# Patient Record
Sex: FEMALE | Race: BLACK OR AFRICAN AMERICAN | NOT HISPANIC OR LATINO | Employment: FULL TIME | URBAN - METROPOLITAN AREA
[De-identification: names, ages, dates, MRNs, and addresses within clinical notes are randomized per-mention and may not be internally consistent; named-entity substitution may affect disease eponyms.]

---

## 2017-02-13 ENCOUNTER — HOSPITAL ENCOUNTER (EMERGENCY)
Facility: HOSPITAL | Age: 52
Discharge: HOME | End: 2017-02-13
Attending: EMERGENCY MEDICINE
Payer: COMMERCIAL

## 2017-02-13 VITALS
OXYGEN SATURATION: 97 % | SYSTOLIC BLOOD PRESSURE: 130 MMHG | TEMPERATURE: 99 F | WEIGHT: 215 LBS | HEIGHT: 63 IN | BODY MASS INDEX: 38.09 KG/M2 | DIASTOLIC BLOOD PRESSURE: 74 MMHG | HEART RATE: 90 BPM | RESPIRATION RATE: 20 BRPM

## 2017-02-13 DIAGNOSIS — B34.9 ACUTE VIRAL SYNDROME: Primary | ICD-10-CM

## 2017-02-13 LAB
B-HCG UR QL: NEGATIVE
BILIRUB UR QL STRIP: NEGATIVE
CLARITY UR: CLEAR
COLOR UR: YELLOW
CTP QC/QA: YES
FLUAV AG SPEC QL IA: NEGATIVE
FLUBV AG SPEC QL IA: NEGATIVE
GLUCOSE UR QL STRIP: NEGATIVE
HGB UR QL STRIP: ABNORMAL
KETONES UR QL STRIP: NEGATIVE
LEUKOCYTE ESTERASE UR QL STRIP: NEGATIVE
MICROSCOPIC COMMENT: NORMAL
NITRITE UR QL STRIP: NEGATIVE
PH UR STRIP: 5 [PH] (ref 5–8)
POCT GLUCOSE: 94 MG/DL (ref 70–110)
PROT UR QL STRIP: NEGATIVE
RBC #/AREA URNS HPF: 4 /HPF (ref 0–4)
SP GR UR STRIP: 1.01 (ref 1–1.03)
SPECIMEN SOURCE: NORMAL
SQUAMOUS #/AREA URNS HPF: 5 /HPF
URN SPEC COLLECT METH UR: ABNORMAL
UROBILINOGEN UR STRIP-ACNC: NEGATIVE EU/DL

## 2017-02-13 PROCEDURE — 25000003 PHARM REV CODE 250: Performed by: NURSE PRACTITIONER

## 2017-02-13 PROCEDURE — 81025 URINE PREGNANCY TEST: CPT | Performed by: NURSE PRACTITIONER

## 2017-02-13 PROCEDURE — 82962 GLUCOSE BLOOD TEST: CPT

## 2017-02-13 PROCEDURE — 81000 URINALYSIS NONAUTO W/SCOPE: CPT

## 2017-02-13 PROCEDURE — 87400 INFLUENZA A/B EACH AG IA: CPT | Mod: 59

## 2017-02-13 PROCEDURE — 99283 EMERGENCY DEPT VISIT LOW MDM: CPT | Mod: 25

## 2017-02-13 RX ORDER — CETIRIZINE HYDROCHLORIDE 10 MG/1
10 TABLET ORAL DAILY PRN
Qty: 20 TABLET | Refills: 0 | Status: SHIPPED | OUTPATIENT
Start: 2017-02-13

## 2017-02-13 RX ORDER — DIPHENHYDRAMINE HCL 50 MG
50 CAPSULE ORAL NIGHTLY PRN
Qty: 20 CAPSULE | Refills: 0 | Status: SHIPPED | OUTPATIENT
Start: 2017-02-13 | End: 2022-01-10 | Stop reason: CLARIF

## 2017-02-13 RX ORDER — ACETAMINOPHEN 500 MG
1000 TABLET ORAL
Status: COMPLETED | OUTPATIENT
Start: 2017-02-13 | End: 2017-02-13

## 2017-02-13 RX ORDER — IBUPROFEN 600 MG/1
600 TABLET ORAL
Status: COMPLETED | OUTPATIENT
Start: 2017-02-13 | End: 2017-02-13

## 2017-02-13 RX ADMIN — ACETAMINOPHEN 1000 MG: 500 TABLET ORAL at 09:02

## 2017-02-13 RX ADMIN — IBUPROFEN 600 MG: 600 TABLET, FILM COATED ORAL at 10:02

## 2017-02-13 NOTE — DISCHARGE INSTRUCTIONS
Please get plenty of rest and drink lots of water to stay well-hydrated.    Take tylenol and ibuprofen for bodyaches, fever, and pain.  You can take 1,000 mg of tylenol (acetaminophen) every 4-6 hours and/or ibuprofen 600 mg every 6 hours (with food).    You can take Zyrtec and/or Benadryl for your runny nose and congestion.  You can also use the Flonase at home.    Follow up with your regular doctor in 2-3 days for further evaluation of your symptoms, if they persist.    Return to the ER for any new/worsening symptoms.

## 2017-02-13 NOTE — ED TRIAGE NOTES
C/o body aches, jaw pain, runny nose, cough,fever, HA x 1 day. Denies abd pain, N/V/D. Tisha selnathanieler plus taken last night.

## 2017-02-13 NOTE — ED PROVIDER NOTES
Encounter Date: 2/13/2017       History     Chief Complaint   Patient presents with    Generalized Body Aches     Pt states she has body aches 2 days now and her throat hurts     Review of patient's allergies indicates:  No Known Allergies  HPI Comments: This is a 51-year-old female with a history of hypertension that comes to the emergency room complaining of cold symptoms since yesterday.  Patient reports associated headache, mouth pain, body aches, runny nose, coughing, bilateral otalgia, subjective fevers and chills, and urinary frequency.  She denies any other GI or  symptoms such as nausea, vomiting, diarrhea, dysuria, or abdominal pain.  Patient reports that she attempted relief with Tisha-[place] plus without much improvement.  Patient believes that she caught the flu from her patient who was diagnosed the flu several days ago.  She reports that she did not get her flu shot this year.  No other modifying factors.    The history is provided by the patient.     Past Medical History   Diagnosis Date    Hypertension      No past medical history pertinent negatives.  History reviewed. No pertinent past surgical history.  Family History   Problem Relation Age of Onset    Hypertension Mother     Cancer Mother     HIV Mother     Hypertension Father     Heart disease Father     Hypertension Maternal Grandmother     Alzheimer's disease Maternal Grandmother      Social History   Substance Use Topics    Smoking status: Former Smoker    Smokeless tobacco: Never Used    Alcohol use No     Review of Systems   Constitutional: Positive for chills and fever (subjective).   HENT: Positive for congestion, ear pain, rhinorrhea and sneezing. Negative for ear discharge, postnasal drip and sore throat.    Respiratory: Positive for cough. Negative for shortness of breath.    Cardiovascular: Negative for chest pain.   Gastrointestinal: Negative for abdominal pain, diarrhea, nausea and vomiting.   Genitourinary: Positive  for frequency. Negative for dysuria, vaginal discharge and vaginal pain.   Musculoskeletal: Positive for myalgias.   Skin: Positive for rash.   Neurological: Positive for headaches.       Physical Exam   Initial Vitals   BP Pulse Resp Temp SpO2   02/13/17 0850 02/13/17 0850 02/13/17 0850 02/13/17 0850 02/13/17 0850   128/72 96 18 99.7 °F (37.6 °C) 96 %     Physical Exam    Nursing note and vitals reviewed.  Constitutional: Vital signs are normal. She appears well-developed and well-nourished. She is not diaphoretic. She is active and cooperative.  Non-toxic appearance. She appears ill. No distress.   Appears ill and uncomfortable   HENT:   Right Ear: Tympanic membrane and ear canal normal.   Left Ear: Tympanic membrane and ear canal normal.   Nose: Mucosal edema and rhinorrhea present. No sinus tenderness.   Mouth/Throat: Uvula is midline and mucous membranes are normal. Posterior oropharyngeal erythema (mild) present. No oropharyngeal exudate.   Mild erythema noted to the posterior oropharynx without tonsillar enlargement, exudates, or cervical lymphadenopathy.  No appreciable dental caries, cracked teeth, or evidence of odontogenic infection.   Eyes: Conjunctivae and EOM are normal. Pupils are equal, round, and reactive to light.   Neck: Normal range of motion. Neck supple.   No meningismus   Cardiovascular: Normal heart sounds.   Pulmonary/Chest: Effort normal and breath sounds normal. No accessory muscle usage. No tachypnea and no bradypnea. No respiratory distress. She has no decreased breath sounds. She has no wheezes. She has no rhonchi. She has no rales.   Lymphadenopathy:     She has no cervical adenopathy.   Neurological: She is alert and oriented to person, place, and time.         ED Course   Procedures  Labs Reviewed   URINALYSIS - Abnormal; Notable for the following:        Result Value    Occult Blood UA 2+ (*)     All other components within normal limits   INFLUENZA A AND B ANTIGEN   URINALYSIS  MICROSCOPIC   POCT URINE PREGNANCY   POCT GLUCOSE   POCT GLUCOSE MONITORING CONTINUOUS                Additional MDM:   Comments: This is an urgent evaluation of a 51-year-old female that presents to the emergency room with cold symptoms for 2 days.  She has multiple upper respiratory symptoms such as runny nose, congestion, otalgia, cough.  Her exam is benign; there is no evidence of focal bacterial infection such as meningitis, otitis media, sinusitis, strep pharyngitis, or pneumonia.  I suspect her symptoms are most likely viral in etiology.  She tested negative for influenza.  She is afebrile with unremarkable vital signs.  She does not appear toxic.  She is well-nourished and well-hydrated.  Based on today's presentation, I see no indication for further workup at this time.  I reassured patient that her symptoms are most likely self-limited and will resolve on their own without antimicrobials.  I encouraged supportive care: Rest, oral hydration, antipyretics, and decongestants.  Of note, the patient did report urinary frequency, but her urine did not appear infected and her Accu-Chek was within normal limits in the ED.  She denied abdominal pain or any other GI or  symptoms.  I am not concerned for acute abdomen.  To follow-up with PCP in 2-3 days for further evaluation of symptoms, if no significant improvement.  She was given strict return precautions for any new or worsening symptoms or concerns..                 ED Course     Clinical Impression:   The encounter diagnosis was Acute viral syndrome.    Disposition:   Disposition: Discharged  Condition: Stable       Ana Luisa Talbert NP  02/13/17 5316

## 2017-02-13 NOTE — ED AVS SNAPSHOT
[facility]  [place] [address]               Lupe Paredes   2017  8:57 AM   ED    Description:  Female : 1965   Department:  [facility]           Your Care was Coordinated By:     Provider Role From To    Patria Silva MD Attending Provider 17 --    Ana Luisa Talbert NP Nurse Practitioner 17 --      Reason for Visit     Generalized Body Aches           Diagnoses this Visit        Comments    Acute viral syndrome    -  Primary       ED Disposition     ED Disposition Condition Comment    Discharge             To Do List           Follow-up Information     Follow up with Boone Gan MD.    Specialty:  Internal Medicine    Why:  As needed    Contact information:    [address] [facility] [address] [address] [facility] [address]\[facility] [place] [facility]\[address]  [facility]  [facility] [address]  [phone]          Follow up with [facility].    Specialty:  Emergency Medicine    Why:  If symptoms worsen    Contact information:    [place] [id number] [phone]       These Medications        Disp Refills Start End    cetirizine (ZYRTEC) 10 MG tablet 20 tablet 0 2017     Take 1 tablet (10 mg total) by mouth daily as needed (runny nose/congestion). - Oral    diphenhydrAMINE (BENADRYL) 50 MG capsule 20 capsule 0 2017     Take 1 capsule (50 mg total) by mouth nightly as needed (rhinorrhea/congestion). - Oral      [facility]     [facility] -  Assistance  Registered nurses in the [facility] provide clinical advisement, health education, appointment booking, and other advisory services.  Call for this free service at [phone].             Medications           Message regarding Medications     Verify the changes and/or additions to your medication regime listed below are the same as discussed with your clinician today.  If any of these changes or  "additions are incorrect, please notify your healthcare provider.        START taking these NEW medications        Refills    cetirizine (ZYRTEC) 10 MG tablet 0    Sig: Take 1 tablet (10 mg total) by mouth daily as needed (runny nose/congestion).    Class: Print    Route: Oral    diphenhydrAMINE (BENADRYL) 50 MG capsule 0    Sig: Take 1 capsule (50 mg total) by mouth nightly as needed (rhinorrhea/congestion).    Class: Print    Route: Oral      These medications were administered today        Dose Freq    ibuprofen tablet 600 mg 600 mg ED 1 Time    Sig: Take 1 tablet (600 mg total) by mouth ED 1 Time.    Class: Normal    Route: Oral    acetaminophen tablet 1,000 mg 1,000 mg ED 1 Time    Sig: Take 2 tablets (1,000 mg total) by mouth ED 1 Time.    Class: Normal    Route: Oral           Verify that the below list of medications is an accurate representation of the medications you are currently taking.  If none reported, the list may be blank. If incorrect, please contact your healthcare provider. Carry this list with you in case of emergency.           Current Medications     lisinopril 10 MG tablet Take 10 mg by mouth once daily.    albuterol 90 mcg/actuation inhaler Inhale 1-2 puffs into the lungs every 6 (six) hours as needed for Wheezing.    cetirizine (ZYRTEC) 10 MG tablet Take 1 tablet (10 mg total) by mouth daily as needed (runny nose/congestion).    diphenhydrAMINE (BENADRYL) 50 MG capsule Take 1 capsule (50 mg total) by mouth nightly as needed (rhinorrhea/congestion).           Clinical Reference Information           Your Vitals Were     BP Pulse Temp Resp Height Weight    128/72 (BP Location: Left arm) 96 99.7 °F (37.6 °C) 18 5' 3" (1.6 m) 97.5 kg (215 lb)    SpO2 BMI             96% 38.09 kg/m2         Allergies as of 2/13/2017     No Known Allergies      Immunizations Administered on Date of Encounter - 2/13/2017     None      ED Micro, Lab, POCT     Start Ordered       Status Ordering Provider    02/13/17 " 1035 02/13/17 1035  POCT glucose  Once      Final result     02/13/17 1008 02/13/17 1007  POCT glucose  Once      Acknowledged     02/13/17 0919 02/13/17 0918  POCT urine pregnancy  Once      Final result     02/13/17 0917 02/13/17 0917  Urinalysis  STAT      Final result     02/13/17 0917 02/13/17 0917  Influenza antigen Nasopharyngeal Swab  STAT      Final result     02/13/17 0917 02/13/17 0917  Urinalysis Microscopic  Once      Final result       ED Imaging Orders     None        Discharge Instructions       Please get plenty of rest and drink lots of water to stay well-hydrated.    Take tylenol and ibuprofen for bodyaches, fever, and pain.  You can take 1,000 mg of tylenol (acetaminophen) every 4-6 hours and/or ibuprofen 600 mg every 6 hours (with food).    You can take Zyrtec and/or Benadryl for your runny nose and congestion.  You can also use the Flonase at home.    Follow up with your regular doctor in 2-3 days for further evaluation of your symptoms, if they persist.    Return to the ER for any new/worsening symptoms.        Discharge References/Attachments     VIRAL SYNDROME (ADULT) (ENGLISH)      [facility] Sign-Up     Activating your [facility] account is as easy as 1-2-3!     1) Visit [email or web address], select Sign Up Now, enter this activation code and your date of birth, then select Next.  [email or web address]  Expires: 3/30/2017 11:32 AM      2) Create a username and password to use when you visit [facility] in the future and select a security question in case you lose your password and select Next.    3) Enter your e-mail address and click Sign Up!    Additional Information  If you have questions, please e-mail [email or web address] or call [phone] to talk to our [facility] staff. Remember, [facility] is NOT to be used for urgent needs. For medical emergencies, dial 911.         Smoking Cessation     If you would like to quit smoking:   You may be eligible for free services if you are a [place]  resident and started smoking cigarettes before September 1, 1988.  Call the [employer]oking [employer]ess[employer] ([employer]) toll free at (542) 256-7541 or (943) 788-6974.   Call 1-800-QUIT-NOW if you do not meet the above criteria.             [facility] complies with applicable Federal civil rights laws and does not discriminate on the basis of race, color, national origin, age, disability, or sex.        Language Assistance Services     ATTENTION: Language assistance services are available, free of charge. Please call [phone].      ATENCIÓN: Si habla español, tiene a ruiz disposición servicios gratuitos de asistencia lingüística. Llame al [phone].     CHÚ Ý: N?u b?n nói Ti?ng Vi?t, có các d?ch v? h? tr? ngôn ng? mi?n phí dành cho b?n. G?i s? [phone].

## 2017-11-13 ENCOUNTER — HOSPITAL ENCOUNTER (EMERGENCY)
Facility: HOSPITAL | Age: 52
Discharge: HOME | End: 2017-11-13
Attending: EMERGENCY MEDICINE
Payer: MEDICAID

## 2017-11-13 VITALS
DIASTOLIC BLOOD PRESSURE: 73 MMHG | HEIGHT: 62 IN | BODY MASS INDEX: 39.01 KG/M2 | SYSTOLIC BLOOD PRESSURE: 128 MMHG | RESPIRATION RATE: 16 BRPM | HEART RATE: 76 BPM | TEMPERATURE: 98 F | OXYGEN SATURATION: 98 % | WEIGHT: 212 LBS

## 2017-11-13 DIAGNOSIS — N10 PYELONEPHRITIS, ACUTE: Primary | ICD-10-CM

## 2017-11-13 LAB
ALBUMIN SERPL BCP-MCNC: 3.4 G/DL
ALP SERPL-CCNC: 77 U/L
ALT SERPL W/O P-5'-P-CCNC: 12 U/L
AMORPH CRY URNS QL MICRO: ABNORMAL
ANION GAP SERPL CALC-SCNC: 6 MMOL/L
AST SERPL-CCNC: 12 U/L
B-HCG UR QL: NEGATIVE
BACTERIA #/AREA URNS HPF: ABNORMAL /HPF
BASOPHILS # BLD AUTO: 0.01 K/UL
BASOPHILS NFR BLD: 0.1 %
BILIRUB SERPL-MCNC: 0.2 MG/DL
BILIRUB UR QL STRIP: NEGATIVE
BUN SERPL-MCNC: 11 MG/DL
CALCIUM SERPL-MCNC: 9.3 MG/DL
CHLORIDE SERPL-SCNC: 107 MMOL/L
CLARITY UR: ABNORMAL
CO2 SERPL-SCNC: 27 MMOL/L
COLOR UR: YELLOW
CREAT SERPL-MCNC: 0.7 MG/DL
CTP QC/QA: YES
DIFFERENTIAL METHOD: ABNORMAL
EOSINOPHIL # BLD AUTO: 0.1 K/UL
EOSINOPHIL NFR BLD: 1.2 %
ERYTHROCYTE [DISTWIDTH] IN BLOOD BY AUTOMATED COUNT: 14.2 %
EST. GFR  (AFRICAN AMERICAN): >60 ML/MIN/1.73 M^2
EST. GFR  (NON AFRICAN AMERICAN): >60 ML/MIN/1.73 M^2
GLUCOSE SERPL-MCNC: 90 MG/DL
GLUCOSE UR QL STRIP: NEGATIVE
HCT VFR BLD AUTO: 36.5 %
HGB BLD-MCNC: 12.4 G/DL
HGB UR QL STRIP: ABNORMAL
HYALINE CASTS #/AREA URNS LPF: 0 /LPF
KETONES UR QL STRIP: NEGATIVE
LEUKOCYTE ESTERASE UR QL STRIP: ABNORMAL
LIPASE SERPL-CCNC: 34 U/L
LYMPHOCYTES # BLD AUTO: 2.8 K/UL
LYMPHOCYTES NFR BLD: 33.1 %
MCH RBC QN AUTO: 27.8 PG
MCHC RBC AUTO-ENTMCNC: 34 G/DL
MCV RBC AUTO: 82 FL
MICROSCOPIC COMMENT: ABNORMAL
MONOCYTES # BLD AUTO: 0.5 K/UL
MONOCYTES NFR BLD: 5.5 %
NEUTROPHILS # BLD AUTO: 5.2 K/UL
NEUTROPHILS NFR BLD: 60.1 %
NITRITE UR QL STRIP: NEGATIVE
PH UR STRIP: 7 [PH] (ref 5–8)
PLATELET # BLD AUTO: 229 K/UL
PMV BLD AUTO: 9.5 FL
POTASSIUM SERPL-SCNC: 3.8 MMOL/L
PROT SERPL-MCNC: 7.8 G/DL
PROT UR QL STRIP: ABNORMAL
RBC # BLD AUTO: 4.46 M/UL
RBC #/AREA URNS HPF: >100 /HPF (ref 0–4)
SODIUM SERPL-SCNC: 140 MMOL/L
SP GR UR STRIP: 1.01 (ref 1–1.03)
SQUAMOUS #/AREA URNS HPF: 6 /HPF
URN SPEC COLLECT METH UR: ABNORMAL
UROBILINOGEN UR STRIP-ACNC: NEGATIVE EU/DL
WBC # BLD AUTO: 8.57 K/UL
WBC #/AREA URNS HPF: 5 /HPF (ref 0–5)

## 2017-11-13 PROCEDURE — 63600175 PHARM REV CODE 636 W HCPCS: Performed by: NURSE PRACTITIONER

## 2017-11-13 PROCEDURE — 96365 THER/PROPH/DIAG IV INF INIT: CPT | Mod: 59

## 2017-11-13 PROCEDURE — 80053 COMPREHEN METABOLIC PANEL: CPT

## 2017-11-13 PROCEDURE — 87086 URINE CULTURE/COLONY COUNT: CPT

## 2017-11-13 PROCEDURE — 87077 CULTURE AEROBIC IDENTIFY: CPT

## 2017-11-13 PROCEDURE — 87186 SC STD MICRODIL/AGAR DIL: CPT

## 2017-11-13 PROCEDURE — 25500020 PHARM REV CODE 255: Performed by: EMERGENCY MEDICINE

## 2017-11-13 PROCEDURE — 99284 EMERGENCY DEPT VISIT MOD MDM: CPT | Mod: 25

## 2017-11-13 PROCEDURE — 96361 HYDRATE IV INFUSION ADD-ON: CPT

## 2017-11-13 PROCEDURE — 83690 ASSAY OF LIPASE: CPT

## 2017-11-13 PROCEDURE — 87088 URINE BACTERIA CULTURE: CPT

## 2017-11-13 PROCEDURE — 25000003 PHARM REV CODE 250: Performed by: NURSE PRACTITIONER

## 2017-11-13 PROCEDURE — 81000 URINALYSIS NONAUTO W/SCOPE: CPT

## 2017-11-13 PROCEDURE — 81025 URINE PREGNANCY TEST: CPT | Performed by: NURSE PRACTITIONER

## 2017-11-13 PROCEDURE — 96375 TX/PRO/DX INJ NEW DRUG ADDON: CPT

## 2017-11-13 PROCEDURE — 85025 COMPLETE CBC W/AUTO DIFF WBC: CPT

## 2017-11-13 RX ORDER — ONDANSETRON 2 MG/ML
8 INJECTION INTRAMUSCULAR; INTRAVENOUS
Status: COMPLETED | OUTPATIENT
Start: 2017-11-13 | End: 2017-11-13

## 2017-11-13 RX ORDER — ASPIRIN 81 MG/1
81 TABLET ORAL DAILY
COMMUNITY

## 2017-11-13 RX ORDER — AMOXICILLIN 500 MG
2 CAPSULE ORAL DAILY
COMMUNITY

## 2017-11-13 RX ORDER — ERGOCALCIFEROL 1.25 MG/1
50000 CAPSULE ORAL
COMMUNITY
End: 2022-01-10 | Stop reason: CLARIF

## 2017-11-13 RX ORDER — HYDROMORPHONE HYDROCHLORIDE 2 MG/ML
1 INJECTION, SOLUTION INTRAMUSCULAR; INTRAVENOUS; SUBCUTANEOUS
Status: COMPLETED | OUTPATIENT
Start: 2017-11-13 | End: 2017-11-13

## 2017-11-13 RX ORDER — CEPHALEXIN 500 MG/1
500 CAPSULE ORAL EVERY 12 HOURS
Qty: 20 CAPSULE | Refills: 0 | Status: SHIPPED | OUTPATIENT
Start: 2017-11-13 | End: 2017-11-23

## 2017-11-13 RX ADMIN — ONDANSETRON 8 MG: 2 INJECTION, SOLUTION INTRAMUSCULAR; INTRAVENOUS at 06:11

## 2017-11-13 RX ADMIN — CEFTRIAXONE 1 G: 1 INJECTION, SOLUTION INTRAVENOUS at 08:11

## 2017-11-13 RX ADMIN — IOHEXOL 75 ML: 350 INJECTION, SOLUTION INTRAVENOUS at 07:11

## 2017-11-13 RX ADMIN — HYDROMORPHONE HYDROCHLORIDE 1 MG: 2 INJECTION, SOLUTION INTRAMUSCULAR; INTRAVENOUS; SUBCUTANEOUS at 06:11

## 2017-11-13 RX ADMIN — SODIUM CHLORIDE 1000 ML: 0.9 INJECTION, SOLUTION INTRAVENOUS at 06:11

## 2017-11-13 NOTE — ED TRIAGE NOTES
Pt reports RLQ abd pain starting this am. Denies chest pain, nv, constipation. Also urinary frequency

## 2017-11-13 NOTE — ED PROVIDER NOTES
"Encounter Date: 11/13/2017    SCRIBE #1 NOTE: I, GomezMacy Apollo, am scribing for, and in the presence of,  Stephon Espinal - NP. I have scribed the following portions of the note - Other sections scribed: HPI, ROS.       History     Chief Complaint   Patient presents with    Abdominal Pain     Right lower abd pain that is intermittent since this am. +Urinary frequency, denies dysuria      CC: Abdominal Pain     51 y/o female with HTN presents to the ED c/o acute onset RLQ abdominal pain that started this morning. The patient reports a "sharp" pain that is severe (7/10); palpation exacerbates the pain. The patient presented to her PCP last wk where she was told that she had "crystals" in her urine. The patient states that the nurse at her PCP's office was supposed to send "something" to this facility; however, she isn't sure what it is they're sending. The patient is also c/o lumbar back pain and urinary frequency. The patient denies hx of abdominal surgeries. The patient denies vaginal bleeding, vaginal d/c, N/V/D, fever, or dysuria. No attempted treatment reported. No other symptoms reported.      The history is provided by the patient. No  was used.     Review of patient's allergies indicates:  No Known Allergies  Past Medical History:   Diagnosis Date    Hypertension      No past surgical history on file.  Family History   Problem Relation Age of Onset    Hypertension Mother     Cancer Mother     HIV Mother     Hypertension Father     Heart disease Father     Hypertension Maternal Grandmother     Alzheimer's disease Maternal Grandmother      Social History   Substance Use Topics    Smoking status: Former Smoker    Smokeless tobacco: Never Used    Alcohol use No     Review of Systems   Constitutional: Negative for chills and fever.   HENT: Negative for rhinorrhea.    Eyes: Negative for redness.   Respiratory: Negative for cough and shortness of breath.    Cardiovascular: Negative for " chest pain.   Gastrointestinal: Positive for abdominal pain (RLQ). Negative for diarrhea, nausea and vomiting.   Genitourinary: Positive for frequency. Negative for difficulty urinating, dysuria, vaginal bleeding and vaginal discharge.   Musculoskeletal: Positive for back pain (lumbar).        (-) arm or leg trouble   Skin: Negative for rash.   Neurological: Negative for headaches.       Physical Exam     Initial Vitals [11/13/17 1603]   BP Pulse Resp Temp SpO2   (!) 160/76 82 16 99.1 °F (37.3 °C) 100 %      MAP       104         Physical Exam    Nursing note and vitals reviewed.  Constitutional: She appears well-developed and well-nourished. She is not diaphoretic. No distress.   HENT:   Head: Normocephalic and atraumatic.   Right Ear: External ear normal.   Left Ear: External ear normal.   Nose: Nose normal.   Eyes: Conjunctivae and EOM are normal. Pupils are equal, round, and reactive to light. Right eye exhibits no discharge. Left eye exhibits no discharge. No scleral icterus.   Neck: Normal range of motion. Neck supple. No thyromegaly present. No tracheal deviation present. No JVD present.   Cardiovascular: Normal rate, regular rhythm, normal heart sounds and intact distal pulses. Exam reveals no gallop and no friction rub.    No murmur heard.  Pulmonary/Chest: Breath sounds normal. No stridor. No respiratory distress. She has no wheezes. She has no rhonchi. She has no rales.   Abdominal: Soft. Bowel sounds are normal. She exhibits no distension and no mass. There is tenderness in the right lower quadrant. There is tenderness at McBurney's point. There is no rigidity, no rebound, no guarding and negative Bryan's sign.   Musculoskeletal: Normal range of motion. She exhibits no edema or tenderness.   Lymphadenopathy:     She has no cervical adenopathy.   Neurological: She is alert and oriented to person, place, and time. She has normal strength and normal reflexes. She displays normal reflexes. No cranial nerve  deficit or sensory deficit.   Skin: Skin is warm and dry. No rash and no abscess noted. No erythema. No pallor.   Psychiatric: She has a normal mood and affect. Her behavior is normal. Judgment and thought content normal.         ED Course   Procedures  Labs Reviewed   CBC W/ AUTO DIFFERENTIAL - Abnormal; Notable for the following:        Result Value    Hematocrit 36.5 (*)     All other components within normal limits   COMPREHENSIVE METABOLIC PANEL - Abnormal; Notable for the following:     Albumin 3.4 (*)     Anion Gap 6 (*)     All other components within normal limits   URINALYSIS - Abnormal; Notable for the following:     Appearance, UA Hazy (*)     Protein, UA 2+ (*)     Occult Blood UA 3+ (*)     Leukocytes, UA 2+ (*)     All other components within normal limits   URINALYSIS MICROSCOPIC - Abnormal; Notable for the following:     RBC, UA >100 (*)     Bacteria, UA Many (*)     All other components within normal limits   CULTURE, URINE   LIPASE   POCT URINE PREGNANCY             Medical Decision Making:   Differential Diagnosis:   Suspect pyelonephritis. Less likely nephrolithiasis, appendicitis, cholecystitis, diverticulitis, colitis, others  Clinical Tests:   Lab Tests: Ordered and Reviewed  Radiological Study: Ordered and Reviewed  ED Management:  52-year-old female presenting for evaluation of abdominal pain that began this morning. She denies any associated symptoms. Pain is in the right lower quadrant. She is afebrile, well-appearing, in no apparent distress. Abdomen is soft without rigidity or guarding. There is RLQ abdominal TTP. Tenderness at McBurney's point. No peritoneal signs. Urinalysis remarkable for evidence of infection. Other labs are unremarkable. CT shows evidence of possible recently passed renal stone and pyelonephritis. I suspect pyelonephritis given urinalysis results. No evidence of appendicitis or additional intra-abdominal pathology. Urine culture is in process. Treated with Rocephin  and Keflex. Instructed patient to follow-up with urology. ED return precautions given. Patient expressed understanding of diagnosis and discharge instructions.  Other:   I have discussed this case with another health care provider.       <> Summary of the Discussion: Case discussed with my attending physician Kris Torres M.D. who agreed with the assessment and plan.            Scribe Attestation:   Scribe #1: I performed the above scribed service and the documentation accurately describes the services I performed. I attest to the accuracy of the note.    Attending Attestation:           Physician Attestation for Scribe:  Physician Attestation Statement for Scribe #1: I, Stephon Espinal- BARNEY, reviewed documentation, as scribed by Jakob Bustillos in my presence, and it is both accurate and complete.                 ED Course      Clinical Impression:   The encounter diagnosis was Pyelonephritis, acute.    Disposition:   Disposition: Discharged  Condition: Stable                        Stephon Espinal NP  11/14/17 0137

## 2017-11-14 NOTE — DISCHARGE INSTRUCTIONS
Follow-up with urology as discussed.    Take antibiotics as prescribed until they are gone even if your symptoms improve.    Return to the emergency department for any new or worsening symptoms.

## 2017-11-15 LAB — BACTERIA UR CULT: NORMAL

## 2017-11-20 ENCOUNTER — HOSPITAL ENCOUNTER (EMERGENCY)
Facility: HOSPITAL | Age: 52
Discharge: HOME | End: 2017-11-20
Attending: EMERGENCY MEDICINE
Payer: MEDICAID

## 2017-11-20 VITALS
SYSTOLIC BLOOD PRESSURE: 139 MMHG | DIASTOLIC BLOOD PRESSURE: 74 MMHG | HEIGHT: 62 IN | TEMPERATURE: 99 F | OXYGEN SATURATION: 98 % | WEIGHT: 215 LBS | RESPIRATION RATE: 18 BRPM | BODY MASS INDEX: 39.56 KG/M2 | HEART RATE: 74 BPM

## 2017-11-20 DIAGNOSIS — N23 RENAL COLIC ON RIGHT SIDE: Primary | ICD-10-CM

## 2017-11-20 LAB
B-HCG UR QL: NEGATIVE
BACTERIA #/AREA URNS HPF: NORMAL /HPF
BILIRUB UR QL STRIP: NEGATIVE
CLARITY UR: CLEAR
COLOR UR: COLORLESS
CTP QC/QA: YES
GLUCOSE UR QL STRIP: NEGATIVE
HGB UR QL STRIP: ABNORMAL
KETONES UR QL STRIP: NEGATIVE
LEUKOCYTE ESTERASE UR QL STRIP: NEGATIVE
MICROSCOPIC COMMENT: NORMAL
NITRITE UR QL STRIP: NEGATIVE
PH UR STRIP: 6 [PH] (ref 5–8)
PROT UR QL STRIP: NEGATIVE
RBC #/AREA URNS HPF: 2 /HPF (ref 0–4)
SP GR UR STRIP: 1 (ref 1–1.03)
SQUAMOUS #/AREA URNS HPF: 2 /HPF
URN SPEC COLLECT METH UR: ABNORMAL
UROBILINOGEN UR STRIP-ACNC: NEGATIVE EU/DL

## 2017-11-20 PROCEDURE — 81000 URINALYSIS NONAUTO W/SCOPE: CPT

## 2017-11-20 PROCEDURE — 25000003 PHARM REV CODE 250: Performed by: NURSE PRACTITIONER

## 2017-11-20 PROCEDURE — 99284 EMERGENCY DEPT VISIT MOD MDM: CPT

## 2017-11-20 PROCEDURE — 81025 URINE PREGNANCY TEST: CPT | Performed by: PHYSICIAN ASSISTANT

## 2017-11-20 RX ORDER — TAMSULOSIN HYDROCHLORIDE 0.4 MG/1
0.4 CAPSULE ORAL DAILY PRN
Qty: 10 CAPSULE | Refills: 0 | Status: SHIPPED | OUTPATIENT
Start: 2017-11-20 | End: 2022-01-10 | Stop reason: CLARIF

## 2017-11-20 RX ORDER — NAPROXEN 500 MG/1
500 TABLET ORAL ONCE
Status: COMPLETED | OUTPATIENT
Start: 2017-11-20 | End: 2017-11-20

## 2017-11-20 RX ORDER — ONDANSETRON 4 MG/1
4 TABLET, ORALLY DISINTEGRATING ORAL EVERY 8 HOURS PRN
Qty: 15 TABLET | Refills: 0 | Status: SHIPPED | OUTPATIENT
Start: 2017-11-20 | End: 2022-01-10 | Stop reason: CLARIF

## 2017-11-20 RX ORDER — TRAMADOL HYDROCHLORIDE 50 MG/1
50 TABLET ORAL EVERY 6 HOURS PRN
Qty: 15 TABLET | Refills: 0 | Status: SHIPPED | OUTPATIENT
Start: 2017-11-20 | End: 2017-11-30

## 2017-11-20 RX ORDER — TRAMADOL HYDROCHLORIDE 50 MG/1
50 TABLET ORAL
Status: COMPLETED | OUTPATIENT
Start: 2017-11-20 | End: 2017-11-20

## 2017-11-20 RX ORDER — TAMSULOSIN HYDROCHLORIDE 0.4 MG/1
0.4 CAPSULE ORAL
Status: COMPLETED | OUTPATIENT
Start: 2017-11-20 | End: 2017-11-20

## 2017-11-20 RX ADMIN — NAPROXEN 500 MG: 500 TABLET ORAL at 09:11

## 2017-11-20 RX ADMIN — TAMSULOSIN HYDROCHLORIDE 0.4 MG: 0.4 CAPSULE ORAL at 09:11

## 2017-11-20 RX ADMIN — TRAMADOL HYDROCHLORIDE 50 MG: 50 TABLET, FILM COATED ORAL at 09:11

## 2017-11-21 NOTE — DISCHARGE INSTRUCTIONS
Please stay well-hydrated and drink plenty of fluids.    Take Flomax daily when you're having urinary symptoms.    Take naproxen for pain and inflammation, as needed.  Take approximately 500 mg every 12 hours.  You can take tramadol for breakthrough pain, as needed.  Tramadol may cause drowsiness, so please use with caution.    Take zofran if you develop nausea or vomiting, as needed.    Follow-up with urology for further evaluation and management of your kidney stones.    Use your urine strainer to try to collect stones from your urine.  On your previous exam 11 years ago, you had calcium oxalate stones present in your urine.  This is most likely what is causing your kidney stones today.  See the handout for ways to prevent kidney stones.    Return to the emergency room for any new or worsening symptoms or concerns.

## 2017-11-21 NOTE — ED PROVIDER NOTES
"Encounter Date: 11/20/2017    SCRIBE #1 NOTE: I, Manuela Barragan , am scribing for, and in the presence of,  Ana Luisa Talbert NP . I have scribed the following portions of the note - Other sections scribed: HPI and ROS .       History     Chief Complaint   Patient presents with    Urinary Tract Infection     states seen 11/14 for UTI given keflex states initially better but got worse since yesterday. C/o pain suprapubic and urinary urgency.     CC:Suprapubic abdominal pain and flank pain.   History provided by the patient.   Pt arrived via personal transportation.     HPI: This 52 y.o. Female, who has HTN, presents to the ED for evaluation of waxing and waning (7/10) suprapubic abdominal pain and Right lateral flank pain that began greater than 1 week ago. She was evaluated in this ED on 11/13 (7 days ago) and was prescribed Cephalexin 500 Mg to take for 7 days. She completed medication yesterday. She notes that symptoms improved some, but began to worsen last night. She also reports experiencing vaginal "throbbing" when urinating. Further associated is reduced appetite and urinary frequency. Today, she reports having blood on tissue paper after urinating. She took Tylenol last night and today (2 hours ago) with no relief of pain. She denies fever, chills, nausea, vomiting, vaginal discharge, or vaginal bleeding. No further reported symptoms.       The history is provided by the patient. No  was used.     Review of patient's allergies indicates:  No Known Allergies  Past Medical History:   Diagnosis Date    Hypertension      History reviewed. No pertinent surgical history.  Family History   Problem Relation Age of Onset    Hypertension Mother     Cancer Mother     HIV Mother     Hypertension Father     Heart disease Father     Hypertension Maternal Grandmother     Alzheimer's disease Maternal Grandmother      Social History   Substance Use Topics    Smoking status: Former Smoker    Smokeless " tobacco: Never Used    Alcohol use No     Review of Systems   Constitutional: Negative for chills and fever.   HENT: Negative for congestion.    Eyes: Negative for redness.   Respiratory: Negative for cough.    Cardiovascular: Negative for chest pain.   Gastrointestinal: Positive for abdominal pain. Negative for diarrhea, nausea and vomiting.   Genitourinary: Positive for flank pain and frequency. Negative for vaginal bleeding and vaginal discharge.   Musculoskeletal: Negative for myalgias.   Skin: Negative for rash.   Neurological: Negative for headaches.       Physical Exam     Initial Vitals [11/20/17 1817]   BP Pulse Resp Temp SpO2   (!) 146/87 84 18 98.6 °F (37 °C) 98 %      MAP       106.67         Physical Exam    Nursing note and vitals reviewed.  Constitutional: Vital signs are normal. She appears well-developed and well-nourished. She is not diaphoretic. She is active and cooperative. She does not appear ill. No distress.   Pulmonary/Chest: No respiratory distress.   Abdominal: Soft. She exhibits no mass. There is no hepatosplenomegaly. There is tenderness (right flank extending to RLQ and suprapubic region). There is no rigidity, no rebound, no guarding, no CVA tenderness, no tenderness at McBurney's point and negative Bryan's sign. No hernia.   Neurological: She is alert and oriented to person, place, and time.   Skin: Skin is warm and dry. Capillary refill takes less than 2 seconds. No pallor.   Psychiatric: She has a normal mood and affect.         ED Course   Procedures  Labs Reviewed   URINALYSIS - Abnormal; Notable for the following:        Result Value    Color, UA Colorless (*)     Occult Blood UA 2+ (*)     All other components within normal limits   URINALYSIS MICROSCOPIC   POCT URINE PREGNANCY                Additional MDM:   Comments: This is an urgent evaluation of a 51 y/o female that presents to the ER c/o R-sided abdominal pain. Pt was seen for this complaint 1 week ago at this facility  and found to have a UTI; she has been compliant with her keflex and reports symptoms were resolving until last night.  Medical records were reviewed, and the e.coli found in her urine culture is sensitive to keflex prescribed.  UA today does not appear infected.  CT abdomen and pelvis performed on last visit was notable for 3 nonobstructing nephrolithiasis, which I suspect is the cause of her returning pain today. Clinically, her symptoms correlate with renal colic. Her appendix was normal in appearance on CT. Her renal function was also normal last week.  I see no indication for repeat workup today. Will start on flomax and Rx antiemetics and pain control.  She already has f/u appointment for Urology at [facility].  UA from 2006 notable for calcium oxalate stones - given handout for diet/lifestyle changes to prevent future stones.  Return precautions.  Case discussed with attending, , and she is in agreement with plan. Stable for discharge and outpatient follow.  .          Scribe Attestation:   Scribe #1: I performed the above scribed service and the documentation accurately describes the services I performed. I attest to the accuracy of the note.    Attending Attestation:           Physician Attestation for Scribe:  Physician Attestation Statement for Scribe #1: I, Manuela Barragan , reviewed documentation, as scribed by Ana Luisa Talbert NP  in my presence, and it is both accurate and complete.                 ED Course      Clinical Impression:   The encounter diagnosis was Renal colic on right side.    Disposition:   Disposition: Discharged  Condition: Stable                        Ana Luisa Talbert NP  11/21/17 8807

## 2017-11-21 NOTE — ED TRIAGE NOTES
Pt states was diagnosed with UTI last week and been on abx but now states that the pain has returned.  Pt states having pain every time voids.  Pt reports small amount of blood in urine.  Denies fever or chills.  Pt rates pain as 7.

## 2018-11-01 ENCOUNTER — HOSPITAL ENCOUNTER (EMERGENCY)
Facility: HOSPITAL | Age: 53
Discharge: HOME | End: 2018-11-01
Attending: EMERGENCY MEDICINE
Payer: MEDICAID

## 2018-11-01 VITALS
SYSTOLIC BLOOD PRESSURE: 134 MMHG | WEIGHT: 213 LBS | RESPIRATION RATE: 18 BRPM | HEIGHT: 62 IN | BODY MASS INDEX: 39.2 KG/M2 | DIASTOLIC BLOOD PRESSURE: 88 MMHG | OXYGEN SATURATION: 100 % | TEMPERATURE: 98 F | HEART RATE: 75 BPM

## 2018-11-01 DIAGNOSIS — K08.89 PAIN, DENTAL: Primary | ICD-10-CM

## 2018-11-01 PROCEDURE — 99284 EMERGENCY DEPT VISIT MOD MDM: CPT

## 2018-11-01 PROCEDURE — 63600175 PHARM REV CODE 636 W HCPCS: Performed by: PHYSICIAN ASSISTANT

## 2018-11-01 RX ORDER — PENICILLIN V POTASSIUM 500 MG/1
500 TABLET, FILM COATED ORAL 4 TIMES DAILY
Qty: 20 TABLET | Refills: 0 | Status: SHIPPED | OUTPATIENT
Start: 2018-11-01 | End: 2018-11-06

## 2018-11-01 RX ORDER — KETOROLAC TROMETHAMINE 30 MG/ML
15 INJECTION, SOLUTION INTRAMUSCULAR; INTRAVENOUS
Status: COMPLETED | OUTPATIENT
Start: 2018-11-01 | End: 2018-11-01

## 2018-11-01 RX ORDER — ETODOLAC 300 MG/1
300 CAPSULE ORAL 2 TIMES DAILY
Qty: 10 CAPSULE | Refills: 0 | OUTPATIENT
Start: 2018-11-01 | End: 2019-05-22

## 2018-11-01 RX ADMIN — KETOROLAC TROMETHAMINE 15 MG: 30 INJECTION, SOLUTION INTRAMUSCULAR at 09:11

## 2018-11-02 NOTE — DISCHARGE INSTRUCTIONS
Walk-in dental clinic:  [facility], [address] [place]. [address].  [phone].  Arrive at 8:00 a.m..  Monday through Thursday. $75 - $195 per extraction cash or credit card

## 2018-11-02 NOTE — ED PROVIDER NOTES
Encounter Date: 11/1/2018    SCRIBE #1 NOTE: I, Elsa Mosley, am scribing for, and in the presence of,  Rene Corey PA-C. I have scribed the following portions of the note - Other sections scribed: HPI and ROS.       History     Chief Complaint   Patient presents with    Otalgia     Pt reports right ear or dental pain that started about 2 days ago (not sure what is the cause, they both hurt).  Reports taking Tylenol at 1800.    Dental Pain     CC: Facial Pain    HPI: This 53 y.o. Female, with a medical history of hypertension, presents to the ED c/o atraumatic right sided lower facial pain that began 2x days ago. Pt reports that the symptoms worsened this morning, noting that the pain is presently radiating towards the right ear. Symptoms are acute, constant and severe (9/10). Pt reports use of Extra Strength Tylenol, Aleve, Goody Powder and Ibuprofen for treatment. Pt denies previous dental extractions, but notes that a posterior right tooth fell out on its own 2x years ago. She also denies trouble swallowing, neck pain, difficulty hearing and fever. No other associated symptoms.         The history is provided by the patient. No  was used.     Review of patient's allergies indicates:  No Known Allergies  Past Medical History:   Diagnosis Date    Hypertension      History reviewed. No pertinent surgical history.  Family History   Problem Relation Age of Onset    Hypertension Mother     Cancer Mother     HIV Mother     Hypertension Father     Heart disease Father     Hypertension Maternal Grandmother     Alzheimer's disease Maternal Grandmother      Social History     Tobacco Use    Smoking status: Former Smoker    Smokeless tobacco: Never Used   Substance Use Topics    Alcohol use: No    Drug use: No     Review of Systems   Constitutional: Negative for fever.   HENT: Negative for sore throat and trouble swallowing.         (+) right sided facial pain radiating towards the right  ear   Respiratory: Negative for shortness of breath.    Cardiovascular: Negative for chest pain.   Gastrointestinal: Negative for nausea.   Genitourinary: Negative for dysuria.   Musculoskeletal: Negative for back pain and neck pain.   Skin: Negative for rash.   Neurological: Negative for weakness.       Physical Exam     Initial Vitals [11/01/18 2110]   BP Pulse Resp Temp SpO2   (!) 149/85 75 14 98.5 °F (36.9 °C) 100 %      MAP       --         Physical Exam    Vitals reviewed.  Constitutional: She appears well-developed and well-nourished. She is not diaphoretic. No distress.   HENT:   Head: Normocephalic and atraumatic.   Right Ear: Tympanic membrane, external ear and ear canal normal.   Left Ear: Tympanic membrane, external ear and ear canal normal.   Nose: Nose normal.   Mouth/Throat: Oropharynx is clear and moist. No oropharyngeal exudate, posterior oropharyngeal edema, posterior oropharyngeal erythema or tonsillar abscesses.   Tenderness to the gum line along the right lower molars with minimal edema. No fluctuance.  No sublingual tenderness or rigidity.   Eyes: Conjunctivae are normal. No scleral icterus.   Neck: Normal range of motion. Neck supple.   No submandibular tenderness, induration, edema, or erythema.   Cardiovascular: Normal rate, regular rhythm and intact distal pulses.   Pulmonary/Chest: No respiratory distress.   Musculoskeletal: Normal range of motion.   Lymphadenopathy:     She has no cervical adenopathy.   Neurological: She is alert and oriented to person, place, and time.   Skin: Skin is warm and dry. No rash noted. No erythema.         ED Course   Procedures  Labs Reviewed - No data to display       Imaging Results    None          Medical Decision Making:   ED Management:  This patient presents to the Emergency Department for dental pain x3 days. The patient reports no fever, chills, difficulty breathing, or inability to open mouth. Exam shows a non-toxic, afebrile, and well appearing  female with VS within normal limits. There is no identified oral drainable abscess at this time.  No facial cellulitis, airway compromise, sublingual swelling/elevation, or trismus. Neck spaces are soft. I considered, but at this time, do not suspect Paulino's angina, deep space infection, peritonsillar infection, pharyngitis, mastoiditis, otitis externa, or acute vascular pathology. There is tenderness and gingival swelling surrounding the right lower molars.  Patient treated with antibiotics and analgesics, and instructed to follow-up ASAP with dentist for further evaluation.  Resources for dentist were provided.  Patient is safe for discharge. Return precautions given.  All questions answered.  Patient verbalized understanding and agreed with plan.              Scribe Attestation:   Scribe #1: I performed the above scribed service and the documentation accurately describes the services I performed. I attest to the accuracy of the note.    Attending Attestation:           Physician Attestation for Scribe:  Physician Attestation Statement for Scribe #1: I, Rene Corey PA-C, reviewed documentation, as scribed by Elsa Mosley in my presence, and it is both accurate and complete.                    Clinical Impression:   The encounter diagnosis was Pain, dental.                             Rene Corey PA-C  11/01/18 9983

## 2019-01-28 ENCOUNTER — HOSPITAL ENCOUNTER (EMERGENCY)
Facility: HOSPITAL | Age: 54
Discharge: HOME | End: 2019-01-29
Attending: EMERGENCY MEDICINE
Payer: MEDICAID

## 2019-01-28 DIAGNOSIS — R07.9 CHEST PAIN: Primary | ICD-10-CM

## 2019-01-28 DIAGNOSIS — R06.02 SOB (SHORTNESS OF BREATH): ICD-10-CM

## 2019-01-28 PROCEDURE — 84484 ASSAY OF TROPONIN QUANT: CPT

## 2019-01-28 PROCEDURE — 93010 EKG 12-LEAD: ICD-10-PCS | Mod: ,,, | Performed by: INTERNAL MEDICINE

## 2019-01-28 PROCEDURE — 99285 EMERGENCY DEPT VISIT HI MDM: CPT | Mod: 25

## 2019-01-28 PROCEDURE — 85025 COMPLETE CBC W/AUTO DIFF WBC: CPT

## 2019-01-28 PROCEDURE — 83880 ASSAY OF NATRIURETIC PEPTIDE: CPT

## 2019-01-28 PROCEDURE — 93005 ELECTROCARDIOGRAM TRACING: CPT

## 2019-01-28 PROCEDURE — 85379 FIBRIN DEGRADATION QUANT: CPT

## 2019-01-28 PROCEDURE — 25000003 PHARM REV CODE 250: Performed by: PHYSICIAN ASSISTANT

## 2019-01-28 PROCEDURE — 93010 ELECTROCARDIOGRAM REPORT: CPT | Mod: ,,, | Performed by: INTERNAL MEDICINE

## 2019-01-28 PROCEDURE — 80053 COMPREHEN METABOLIC PANEL: CPT

## 2019-01-28 RX ORDER — ASPIRIN 325 MG
325 TABLET ORAL
Status: COMPLETED | OUTPATIENT
Start: 2019-01-28 | End: 2019-01-28

## 2019-01-28 RX ADMIN — ASPIRIN 325 MG ORAL TABLET 325 MG: 325 PILL ORAL at 11:01

## 2019-01-29 VITALS
TEMPERATURE: 98 F | HEIGHT: 64 IN | WEIGHT: 210 LBS | BODY MASS INDEX: 35.85 KG/M2 | OXYGEN SATURATION: 98 % | HEART RATE: 71 BPM | DIASTOLIC BLOOD PRESSURE: 84 MMHG | SYSTOLIC BLOOD PRESSURE: 143 MMHG | RESPIRATION RATE: 18 BRPM

## 2019-01-29 LAB
ALBUMIN SERPL BCP-MCNC: 3.8 G/DL
ALP SERPL-CCNC: 66 U/L
ALT SERPL W/O P-5'-P-CCNC: 20 U/L
ANION GAP SERPL CALC-SCNC: 9 MMOL/L
AST SERPL-CCNC: 18 U/L
BASOPHILS # BLD AUTO: 0.02 K/UL
BASOPHILS NFR BLD: 0.2 %
BILIRUB SERPL-MCNC: 0.1 MG/DL
BNP SERPL-MCNC: <10 PG/ML
BUN SERPL-MCNC: 13 MG/DL
CALCIUM SERPL-MCNC: 9.7 MG/DL
CHLORIDE SERPL-SCNC: 105 MMOL/L
CO2 SERPL-SCNC: 28 MMOL/L
CREAT SERPL-MCNC: 0.7 MG/DL
D DIMER PPP IA.FEU-MCNC: 0.37 MG/L FEU
DIFFERENTIAL METHOD: ABNORMAL
EOSINOPHIL # BLD AUTO: 0.4 K/UL
EOSINOPHIL NFR BLD: 4.2 %
ERYTHROCYTE [DISTWIDTH] IN BLOOD BY AUTOMATED COUNT: 14.1 %
EST. GFR  (AFRICAN AMERICAN): >60 ML/MIN/1.73 M^2
EST. GFR  (NON AFRICAN AMERICAN): >60 ML/MIN/1.73 M^2
GLUCOSE SERPL-MCNC: 96 MG/DL
HCT VFR BLD AUTO: 36.5 %
HGB BLD-MCNC: 12 G/DL
LYMPHOCYTES # BLD AUTO: 3.6 K/UL
LYMPHOCYTES NFR BLD: 42.2 %
MCH RBC QN AUTO: 27.6 PG
MCHC RBC AUTO-ENTMCNC: 32.9 G/DL
MCV RBC AUTO: 84 FL
MONOCYTES # BLD AUTO: 0.5 K/UL
MONOCYTES NFR BLD: 6.2 %
NEUTROPHILS # BLD AUTO: 4 K/UL
NEUTROPHILS NFR BLD: 47.2 %
PLATELET # BLD AUTO: 271 K/UL
PMV BLD AUTO: 9.9 FL
POTASSIUM SERPL-SCNC: 3.7 MMOL/L
PROT SERPL-MCNC: 8 G/DL
RBC # BLD AUTO: 4.35 M/UL
SODIUM SERPL-SCNC: 142 MMOL/L
TROPONIN I SERPL DL<=0.01 NG/ML-MCNC: <0.006 NG/ML
WBC # BLD AUTO: 8.43 K/UL

## 2019-01-29 PROCEDURE — 25000003 PHARM REV CODE 250: Performed by: PHYSICIAN ASSISTANT

## 2019-01-29 RX ORDER — ACETAMINOPHEN 325 MG/1
650 TABLET ORAL
Status: COMPLETED | OUTPATIENT
Start: 2019-01-29 | End: 2019-01-29

## 2019-01-29 RX ORDER — DEXTROMETHORPHAN HYDROBROMIDE, GUAIFENESIN 5; 100 MG/5ML; MG/5ML
650 LIQUID ORAL EVERY 8 HOURS PRN
Refills: 0 | COMMUNITY
Start: 2019-01-29 | End: 2022-01-10 | Stop reason: CLARIF

## 2019-01-29 RX ADMIN — ACETAMINOPHEN 650 MG: 325 TABLET ORAL at 01:01

## 2019-01-29 RX ADMIN — LIDOCAINE HYDROCHLORIDE: 20 SOLUTION ORAL; TOPICAL at 01:01

## 2019-01-29 NOTE — ED TRIAGE NOTES
Patient came to the ED with complaint of chest pain and SOB x 2 days. Patient states that she has been coughing

## 2019-01-29 NOTE — ED PROVIDER NOTES
Encounter Date: 1/28/2019  SORT: This is a 53 y.o. female who presents for emergent consideration of chest pain x 2 days. She denies any further symptoms. Denies attempted tx of pain. Patient will be moved to a room when one is available. Orders placed.  ALLY Meredith PA-C     SCRIBE #1 NOTE: Feilpe SILVA am scribing for, and in the presence of,  Rene Corey PA-C. I have scribed the following portions of the note - Other sections scribed: HPI/ROS.       History     Chief Complaint   Patient presents with    Cough     pt reports non productive cough, runny nose, congestion and upper/middle chest tightness ongoing for 2 days; pt denies taking any medications for symptoms; pt denies hx of asthma    Nasal Congestion     CC: Chest Pain     HPI: This 53 y.o. female presents to the ED for an emergent evaluation of sternal chest pain and SOB followed by a nonproductive cough and rhinorrhea x 2 days. Symptoms worsened today. Ambulation and bending down exacerbates chest pain. Reports she was working out yesterday but had to stop because of SOB. States she was evaluated at this ED for similar symptoms previously where she was d/x with bronchitis. No prior tx today. No hx of cardiac problems or allergies to medications. She does not smoke cigarettes. No alleviating factors. Otherwise, pt denies fever, chills, n/v/d, leg pain, leg swelling, back pain, sore throat, calf pain, and any other associated symptoms.       The history is provided by the patient. No  was used.     Review of patient's allergies indicates:  No Known Allergies  Past Medical History:   Diagnosis Date    Hypertension      History reviewed. No pertinent surgical history.  Family History   Problem Relation Age of Onset    Hypertension Mother     Cancer Mother     HIV Mother     Hypertension Father     Heart disease Father     Hypertension Maternal Grandmother     Alzheimer's disease Maternal Grandmother      Social  History     Tobacco Use    Smoking status: Former Smoker    Smokeless tobacco: Never Used   Substance Use Topics    Alcohol use: No    Drug use: No     Review of Systems   Constitutional: Negative for chills and fever.   HENT: Positive for rhinorrhea. Negative for congestion, ear pain and sore throat.    Eyes: Negative for pain and visual disturbance.   Respiratory: Positive for cough and shortness of breath.    Cardiovascular: Positive for chest pain.   Gastrointestinal: Negative for abdominal pain, diarrhea, nausea and vomiting.   Genitourinary: Negative for dysuria.   Musculoskeletal: Negative for back pain and neck pain.   Skin: Negative for rash.   Neurological: Negative for headaches.   All other systems reviewed and are negative.      Physical Exam     Initial Vitals [01/28/19 2123]   BP Pulse Resp Temp SpO2   131/75 78 16 98.3 °F (36.8 °C) 100 %      MAP       --         Physical Exam    Vitals reviewed.  Constitutional: She appears well-developed and well-nourished. She is not diaphoretic. No distress.   HENT:   Head: Normocephalic and atraumatic.   Right Ear: External ear normal.   Left Ear: External ear normal.   Nose: Nose normal.   Mouth/Throat: Oropharynx is clear and moist. No oropharyngeal exudate.   Eyes: Conjunctivae are normal. No scleral icterus.   Neck: Normal range of motion. Neck supple. No JVD present.   Cardiovascular: Normal rate, regular rhythm, normal heart sounds and intact distal pulses.   Pulmonary/Chest: Breath sounds normal. No respiratory distress. She has no wheezes. She has no rhonchi. She has no rales. She exhibits no tenderness.   Musculoskeletal: Normal range of motion. She exhibits no edema or tenderness.   Lymphadenopathy:     She has no cervical adenopathy.   Neurological: She is alert and oriented to person, place, and time.   Skin: Skin is warm and dry.         ED Course   Procedures  Labs Reviewed   CBC W/ AUTO DIFFERENTIAL - Abnormal; Notable for the following  components:       Result Value    Hematocrit 36.5 (*)     All other components within normal limits   COMPREHENSIVE METABOLIC PANEL   TROPONIN I   B-TYPE NATRIURETIC PEPTIDE   D DIMER, QUANTITATIVE     EKG Readings: (Independently Interpreted)   Initial Reading: No STEMI. Rhythm: Normal Sinus Rhythm. Ectopy: No Ectopy. Conduction: Normal. ST Segments: Normal ST Segments. T Waves: Normal. Axis: Normal.       Imaging Results          X-Ray Chest PA And Lateral (Final result)  Result time 01/29/19 00:24:09    Final result by Ryan Haile MD (01/29/19 00:24:09)                 Impression:      No acute process.      Electronically signed by: Ryan Haile MD  Date:    01/29/2019  Time:    00:24             Narrative:    EXAMINATION:  XR CHEST PA AND LATERAL    CLINICAL HISTORY:  Chest pain.    TECHNIQUE:  PA and lateral views of the chest were performed.    COMPARISON:  11/16/2016    FINDINGS:  The trachea is unremarkable.  The cardiomediastinal silhouette is within normal limits.  The hemidiaphragms are unremarkable.  There is no evidence of free air beneath the hemidiaphragms.  There are no pleural effusions.  There is no evidence of a pneumothorax.  There is no evidence of pneumomediastinum.  No airspace opacities are present.  The osseous structures are unremarkable.  The subcutaneous tissues are within normal limits.    There are postoperative changes in the right upper quadrant.  The remainder of the visualized upper abdominal structures are within normal limits.                                 Medical Decision Making:   ED Management:  53 year old patient with hx of HTN presenting secondary to intermittent chest tightness and shortness of breath, also with mild cough and nasal congestion without obvious relationship. Patient is at lower risk of ACS due to risk factors and HPI with a heart score of 2.  Troponin, Cxr, ekg, and labs showed no significant abnormality. Patient received aspirin, Tylenol and GI cocktail.      Also considered but less likely:   PE: normal rate, no sob/recent immovilization/surgery/travel/family history, negative D-dimer  Pneumonia: chest xray negative. No fever or leukoscytosis. No cough and lungs non consistent with pna  Tamponade: unlikely due to chest xray and ekg  STEMI: No STEMI on ekg  Dissection: equal pulses bilaterally and no ripping chest pain to the back  Esophageal rupture: no dysphagia or vomiting and chest xray negative for mediastinal air  Arrhythmia: no arrhythmia on ekg  CHF: no fluid overload on Cxr and physical exam  Pneumothorax: bilateral breath sounds and no signs of pneumothorax on chest xray     Patient felt mild improvement with interventions and has a lower risk of impending cardiac failure to the heart score of 2. Patient was discharged with follow up with Cardiology. Return precautions given, patient understands and agrees with plan. All questions answered.  Instructed to follow up with PCP.     Case discussed with Dr. Gaines      Additional MDM:   Heart Score:    History:          Slightly suspicious.  ECG:             Normal  Age:               45-65 years  Risk factors: 1-2 risk factors  Troponin:       Less than or equal to normal limit  Final Score: 2             Scribe Attestation:   Scribe #1: I performed the above scribed service and the documentation accurately describes the services I performed. I attest to the accuracy of the note.    Attending Attestation:           Physician Attestation for Scribe:  Physician Attestation Statement for Scribe #1: I, Rene Corey PA-C, reviewed documentation, as scribed by Felipe Lawrence in my presence, and it is both accurate and complete.                    Clinical Impression:   The primary encounter diagnosis was Chest pain. A diagnosis of SOB (shortness of breath) was also pertinent to this visit.                             Rene Corey PA-C  01/29/19 0603

## 2019-05-08 ENCOUNTER — HOSPITAL ENCOUNTER (EMERGENCY)
Facility: HOSPITAL | Age: 54
Discharge: HOME | End: 2019-05-08
Attending: EMERGENCY MEDICINE
Payer: MEDICAID

## 2019-05-08 VITALS
OXYGEN SATURATION: 100 % | RESPIRATION RATE: 18 BRPM | SYSTOLIC BLOOD PRESSURE: 140 MMHG | WEIGHT: 210 LBS | DIASTOLIC BLOOD PRESSURE: 88 MMHG | HEART RATE: 80 BPM | TEMPERATURE: 98 F | HEIGHT: 65 IN | BODY MASS INDEX: 34.99 KG/M2

## 2019-05-08 DIAGNOSIS — M53.3 COCCYX PAIN: ICD-10-CM
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
## 2019-05-08 DIAGNOSIS — S20.222A CONTUSION OF LEFT SIDE OF BACK, INITIAL ENCOUNTER: Primary | ICD-10-CM

## 2019-05-08 DIAGNOSIS — M25.559 HIP PAIN: ICD-10-CM

## 2019-05-08 PROCEDURE — 63600175 PHARM REV CODE 636 W HCPCS: Performed by: PHYSICIAN ASSISTANT

## 2019-05-08 PROCEDURE — 99284 EMERGENCY DEPT VISIT MOD MDM: CPT | Mod: 25

## 2019-05-08 PROCEDURE — 96372 THER/PROPH/DIAG INJ SC/IM: CPT

## 2019-05-08 PROCEDURE — 25000003 PHARM REV CODE 250: Performed by: PHYSICIAN ASSISTANT

## 2019-05-08 RX ORDER — KETOROLAC TROMETHAMINE 30 MG/ML
15 INJECTION, SOLUTION INTRAMUSCULAR; INTRAVENOUS
Status: COMPLETED | OUTPATIENT
Start: 2019-05-08 | End: 2019-05-08

## 2019-05-08 RX ORDER — CYCLOBENZAPRINE HCL 10 MG
10 TABLET ORAL 3 TIMES DAILY PRN
Qty: 15 TABLET | Refills: 0 | Status: SHIPPED | OUTPATIENT
Start: 2019-05-08 | End: 2019-05-15

## 2019-05-08 RX ORDER — CYCLOBENZAPRINE HCL 10 MG
10 TABLET ORAL
Status: COMPLETED | OUTPATIENT
Start: 2019-05-08 | End: 2019-05-08

## 2019-05-08 RX ORDER — LIDOCAINE 50 MG/G
1 PATCH TOPICAL DAILY
Qty: 15 PATCH | Refills: 0 | Status: SHIPPED | OUTPATIENT
Start: 2019-05-08 | End: 2019-05-23

## 2019-05-08 RX ORDER — LIDOCAINE 50 MG/G
1 PATCH TOPICAL
Status: DISCONTINUED | OUTPATIENT
Start: 2019-05-08 | End: 2019-05-08 | Stop reason: HOSPADM

## 2019-05-08 RX ORDER — TRAMADOL HYDROCHLORIDE 50 MG/1
50 TABLET ORAL EVERY 8 HOURS PRN
Qty: 5 TABLET | Refills: 0 | Status: SHIPPED | OUTPATIENT
Start: 2019-05-08 | End: 2019-05-10

## 2019-05-08 RX ORDER — TRAMADOL HYDROCHLORIDE 50 MG/1
50 TABLET ORAL
Status: COMPLETED | OUTPATIENT
Start: 2019-05-08 | End: 2019-05-08

## 2019-05-08 RX ORDER — IBUPROFEN 600 MG/1
600 TABLET ORAL EVERY 6 HOURS PRN
Qty: 20 TABLET | Refills: 0 | Status: SHIPPED | OUTPATIENT
Start: 2019-05-08 | End: 2019-05-13

## 2019-05-08 RX ADMIN — LIDOCAINE 1 PATCH: 50 PATCH TOPICAL at 07:05

## 2019-05-08 RX ADMIN — CYCLOBENZAPRINE HYDROCHLORIDE 10 MG: 10 TABLET, FILM COATED ORAL at 07:05

## 2019-05-08 RX ADMIN — TRAMADOL HYDROCHLORIDE 50 MG: 50 TABLET, FILM COATED ORAL at 08:05

## 2019-05-08 RX ADMIN — KETOROLAC TROMETHAMINE 15 MG: 30 INJECTION, SOLUTION INTRAMUSCULAR at 07:05

## 2019-05-09 NOTE — ED TRIAGE NOTES
Patient arrived to ED per EMS with c/o left hip pain and left lumbar area back pain, and left shoulder pain secondary to slipping on wet floor while at work x 2 hours.  Denies hitting head, LOC, or neck pain.  Ambulatory with mild difficulty.  No obvious deformity noted.  GCS 15.  No acute distress noted.

## 2019-05-09 NOTE — ED PROVIDER NOTES
"Encounter Date: 5/8/2019    SCRIBE #1 NOTE: I, Melissa Jenkins, am scribing for, and in the presence of,  SIMRAN Fernandez. I have scribed the following portions of the note - Other sections scribed: HPI.       History     Chief Complaint   Patient presents with    Fall     Pt reports slip and fall at work.  Denies LOC.  Denies hitting head.  Reports L lower back pain.      CC: Fall     HPI: This 53 y.o female with a medical history of HTN presents to the ED via EMS for emergent evaluation of severe midline 10/10 lumbar back pain and L hip pain s/p mechanical slip and fall that occurred 2 hours PTA when pt slipped on wet floor at work landing on her back and buttocks. Denies head injury, LOC, use of blood thinners. She reports having nausea and dizziness "for a few minutes" following the fall but denies any preceding or since that time. Pt reports she was able to go home and developed a HA 1 hour following the fall while at home prompting her to call EMS and come to ED.  Patient notes she has only been ambulatory with assistance after fall.  Patient denies emesis, diarrhea, abdominal pain, chest pain, SOB, dysuria, hematuria, urinary urgency or frequency numbness/tingling, weakness, or neck pain. No alleviating or exacerbating factors reported.       The history is provided by the patient. No  was used.     Review of patient's allergies indicates:  No Known Allergies  Past Medical History:   Diagnosis Date    Hypertension      History reviewed. No pertinent surgical history.  Family History   Problem Relation Age of Onset    Hypertension Mother     Cancer Mother     HIV Mother     Hypertension Father     Heart disease Father     Hypertension Maternal Grandmother     Alzheimer's disease Maternal Grandmother      Social History     Tobacco Use    Smoking status: Former Smoker    Smokeless tobacco: Never Used   Substance Use Topics    Alcohol use: No    Drug use: No     Review of " Systems   Constitutional: Negative for chills and fever.   HENT: Negative for nosebleeds and trouble swallowing.    Eyes: Negative for redness and visual disturbance.   Respiratory: Negative for shortness of breath.    Cardiovascular: Negative for chest pain.   Gastrointestinal: Positive for nausea. Negative for abdominal pain, diarrhea and vomiting.   Genitourinary: Negative for dysuria, frequency, hematuria and urgency.   Musculoskeletal: Positive for back pain. Negative for gait problem and neck pain.        (+) L hip pain     Neurological: Positive for dizziness and headaches. Negative for syncope, speech difficulty, weakness, light-headedness and numbness.   Psychiatric/Behavioral: Negative for confusion.       Physical Exam     Initial Vitals [05/08/19 1857]   BP Pulse Resp Temp SpO2   136/86 86 18 98 °F (36.7 °C) 97 %      MAP       --         Physical Exam    Nursing note and vitals reviewed.  Constitutional: She appears well-developed and well-nourished.   HENT:   Head: Normocephalic.   Right Ear: Hearing and external ear normal.   Left Ear: Hearing and external ear normal.   Nose: Nose normal.   Mouth/Throat: Oropharynx is clear and moist.   Eyes: Conjunctivae and EOM are normal. Pupils are equal, round, and reactive to light.   Neck:   TTP of left paraspinal musculature     Cardiovascular: Normal rate and regular rhythm. Exam reveals no gallop and no friction rub.    No murmur heard.  Pulses:       Radial pulses are 2+ on the right side, and 2+ on the left side.        Dorsalis pedis pulses are 2+ on the right side, and 2+ on the left side.   Pulmonary/Chest: Breath sounds normal. She has no wheezes. She has no rhonchi. She has no rales.   Abdominal: Soft. Bowel sounds are normal. She exhibits no distension. There is no tenderness. There is no rebound, no guarding, no tenderness at McBurney's point and negative Bryan's sign.   Musculoskeletal: Normal range of motion.   Pt slow to sit up and move due to  worsening pain. Midline ttp of lumbar spine and over coccyx and left lumbar paraspinal musculature. Normal strength. Pt able to bear weight but ambulates slowly due to pain and is favoring RLE due to pain.    Lymphadenopathy:     She has no cervical adenopathy.   Neurological: She is alert. She has normal strength. No cranial nerve deficit or sensory deficit. Coordination and gait normal.   Skin: Skin is warm and dry.   Psychiatric: She has a normal mood and affect.         ED Course   Procedures  Labs Reviewed - No data to display       Imaging Results          X-Ray Hip 2 View Left (Final result)  Result time 05/08/19 20:11:37    Final result by Fredi Cantu MD (05/08/19 20:11:37)                 Impression:      There is no evidence of fracture or subluxation.      Electronically signed by: Fredi Cantu MD  Date:    05/08/2019  Time:    20:11             Narrative:    EXAMINATION:  XR HIP 2 VIEW LEFT    CLINICAL HISTORY:  Pain in unspecified hip    TECHNIQUE:  AP view of the pelvis and frog leg lateral view of the left hip were performed.    COMPARISON:  None    FINDINGS:  No fractures or dislocations.  Unremarkable visualized bony structures. IUD present in the pelvis.                               X-Ray Lumbar Spine Ap And Lateral (Final result)  Result time 05/08/19 20:23:24    Final result by Alexandra Gonsalez MD (05/08/19 20:23:24)                 Impression:      No acute lumbar spine abnormalities identified.      Electronically signed by: Alexandra Gonsalez MD  Date:    05/08/2019  Time:    20:23             Narrative:    EXAMINATION:  XR LUMBAR SPINE AP AND LATERAL    CLINICAL HISTORY:  Low back pain, minor trauma;    TECHNIQUE:  AP, lateral and spot images were performed of the lumbar spine.    COMPARISON:  None    FINDINGS:  No evidence of acute lumbar spine fracture or subluxation.  There is partial sacralization of the L5 segment.  Minimal grade 1 anterolisthesis is seen of L4 on L5 secondary to  facet arthropathy.  Visualized sacrum is normal in appearance.  IUD is noted in the pelvis.                               X-Ray Sacrum And Coccyx (Final result)  Result time 05/08/19 20:14:39    Final result by Fredi Cantu MD (05/08/19 20:14:39)                 Impression:      No acute fracture.      Electronically signed by: Fredi Cantu MD  Date:    05/08/2019  Time:    20:14             Narrative:    EXAMINATION:  XR SACRUM AND COCCYX    CLINICAL HISTORY:  Sacrococcygeal disorders, not elsewhere classified    TECHNIQUE:  Sacrum and coccyx three views:    COMPARISON:  None    FINDINGS:  No fractures or dislocations.  Unremarkable visualized bony structures. There is mild sclerosis/spurring along the superior aspect of the SI joints.  The lower sacroiliac joint spaces appear well maintained.  Pelvic IUD projects over the sacrum.                                 Medical Decision Making:   Initial Assessment:   54 y/o female with history of hypertension presenting for evaluation of lumbar back pain, tailbone pain, left hip pain status mechanical slip and fall that occurred at 1700 today.  He denies any head injury, LOC.  She endorses dizziness and nausea that occurred following the fall that resolved after few minutes.  Reports she developed headache 1 hr after the fall prompting her to activate EMS and present to the ED.  She denies any persisting nausea vomiting diarrhea, dizziness, lightheadedness, chest pain, shortness of breath. Exam above.  No focal neurologic deficits.  There is midline tenderness to palpation of lumbar spine.  X-rays negative for fracture or dislocation. Remarkablefor arthritic changes.  X-ray hip and coccyx are negative for fracture dislocation.  Patient given Toradol IM, Flexeril p.o. and Lidoderm patch applied in the ED.  She reports this has helped to resolve her headache.  She reports no improvement of her back pain, hip pain and tailbone pain.  Tramadol ordered prior to  discharge. Doubt skull fracture, intracranial bleed or cardiac etiology of her dizziness and headache at this time.  Will have patient follow up with [facility] care and [facility] for further evaluation management.  Return to the ER for worsening symptoms worsening headache, visual disturbance, vomiting, weakness, paresthesias or as needed. Discussed with Dr. Mcrae who agrees with assessment andplan             Scribe Attestation:   Scribe #1: I performed the above scribed service and the documentation accurately describes the services I performed. I attest to the accuracy of the note.    Attending Attestation:     Physician Attestation Statement for NP/PA:   I discussed this assessment and plan of this patient with the NP/PA, but I did not personally examine the patient. The face to face encounter was performed by the NP/PA.        Physician Attestation for Scribe:  Physician Attestation Statement for Scribe #1: I, SIMRAN Fernandez, reviewed documentation, as scribed by Melissa Jenkins in my presence, and it is both accurate and complete.                    Clinical Impression:       ICD-10-CM ICD-9-CM   1. Contusion of left side of back, initial encounter S20.222A 922.31   2. Hip pain M25.559 719.45   3. Coccyx pain M53.3 724.79   4. Fall, initial encounter W19.XXXA E888.9                             SIMRAN Fernandez-C  05/08/19 2110       Alfredo Mcrae MD  05/08/19 2131

## 2019-05-09 NOTE — DISCHARGE INSTRUCTIONS
Take ibuprofen, Flexeril and Lidoderm patches as prescribed.  Follow up with [facility] care and [facility] for further evaluation management.  Return to the ER for worsening symptoms or as needed.

## 2019-05-22 ENCOUNTER — HOSPITAL ENCOUNTER (EMERGENCY)
Facility: HOSPITAL | Age: 54
Discharge: HOME | End: 2019-05-22
Attending: EMERGENCY MEDICINE
Payer: MEDICAID

## 2019-05-22 VITALS
WEIGHT: 208 LBS | SYSTOLIC BLOOD PRESSURE: 134 MMHG | RESPIRATION RATE: 18 BRPM | BODY MASS INDEX: 36.86 KG/M2 | OXYGEN SATURATION: 99 % | HEART RATE: 95 BPM | HEIGHT: 63 IN | TEMPERATURE: 100 F | DIASTOLIC BLOOD PRESSURE: 73 MMHG

## 2019-05-22 DIAGNOSIS — K04.7 DENTAL INFECTION: ICD-10-CM

## 2019-05-22 DIAGNOSIS — K04.01 ACUTE PULPITIS: Primary | ICD-10-CM

## 2019-05-22 PROCEDURE — 99284 EMERGENCY DEPT VISIT MOD MDM: CPT

## 2019-05-22 PROCEDURE — 25000003 PHARM REV CODE 250: Performed by: NURSE PRACTITIONER

## 2019-05-22 RX ORDER — ACETAMINOPHEN 500 MG
1000 TABLET ORAL
Status: COMPLETED | OUTPATIENT
Start: 2019-05-22 | End: 2019-05-22

## 2019-05-22 RX ORDER — AMOXICILLIN AND CLAVULANATE POTASSIUM 875; 125 MG/1; MG/1
1 TABLET, FILM COATED ORAL
Status: COMPLETED | OUTPATIENT
Start: 2019-05-22 | End: 2019-05-22

## 2019-05-22 RX ORDER — HYDROCODONE BITARTRATE AND ACETAMINOPHEN 5; 325 MG/1; MG/1
1 TABLET ORAL EVERY 6 HOURS PRN
Qty: 8 TABLET | Refills: 0 | Status: SHIPPED | OUTPATIENT
Start: 2019-05-22 | End: 2019-06-01

## 2019-05-22 RX ORDER — MELOXICAM 7.5 MG/1
7.5 TABLET ORAL DAILY
Qty: 20 TABLET | Refills: 0 | Status: SHIPPED | OUTPATIENT
Start: 2019-05-22 | End: 2022-01-10 | Stop reason: CLARIF

## 2019-05-22 RX ORDER — AMOXICILLIN AND CLAVULANATE POTASSIUM 875; 125 MG/1; MG/1
1 TABLET, FILM COATED ORAL 2 TIMES DAILY
Qty: 20 TABLET | Refills: 0 | Status: SHIPPED | OUTPATIENT
Start: 2019-05-22 | End: 2019-06-01

## 2019-05-22 RX ADMIN — ACETAMINOPHEN 1000 MG: 500 TABLET, FILM COATED ORAL at 03:05

## 2019-05-22 RX ADMIN — AMOXICILLIN AND CLAVULANATE POTASSIUM 1 TABLET: 875; 125 TABLET, FILM COATED ORAL at 03:05

## 2019-05-22 NOTE — DISCHARGE INSTRUCTIONS
It appears you have a dental infection.    Please AUGMENTIN for the infection for 10 days.    Take MOBIC for pain/inflammation.  If you still have pain, take NORCO for breakthrough pain.  Please do not take Norco while working, drinking alcohol, driving/operating heavy machinery, swimming. It may cause drowsiness, impair judgment, and reduce physical capabilities.    Please follow up with a dentist within 1 week. Call for an appointment as soon as possible.  If you do not have a dentist, refer to the dental resources page for nearby clinics.    Return to the ER for any new or worsening symptoms or concerns.

## 2019-05-22 NOTE — ED PROVIDER NOTES
Encounter Date: 5/22/2019       History     Chief Complaint   Patient presents with    Oral Swelling     pt c/o left upper dental pain, possible abscess for the last 3 days     This is a 53-year-old female with a history of hypertension that comes the emergency room complaining of left upper dental pain for 3 days.  Patient reports that she has a cracked tooth in that area for approximately 6 weeks, but pain started only several days ago.  Patient reports associated facial swelling and intermittent myalgias.  Patient denies fevers, nausea, vomiting, difficulty swallowing, choking while eating.  Pt reports attempting relief with advil at home with no significant improvement, last dose was 600 mg approximately 2 hours ago.  Pt reports that she has a dental appointment scheduled for 5/29/19.        Review of patient's allergies indicates:  No Known Allergies  Past Medical History:   Diagnosis Date    Hypertension      History reviewed. No pertinent surgical history.  Family History   Problem Relation Age of Onset    Hypertension Mother     Cancer Mother     HIV Mother     Hypertension Father     Heart disease Father     Hypertension Maternal Grandmother     Alzheimer's disease Maternal Grandmother      Social History     Tobacco Use    Smoking status: Former Smoker    Smokeless tobacco: Never Used   Substance Use Topics    Alcohol use: No    Drug use: No     Review of Systems   Constitutional: Negative for chills and fever.   HENT: Positive for dental problem and facial swelling (left). Negative for drooling.    Respiratory: Negative for cough and shortness of breath.    Gastrointestinal: Negative for nausea and vomiting.   Musculoskeletal: Positive for back pain (from fall 3 weeks ago, no new trauma or symptoms).   Neurological: Negative for weakness, numbness and headaches.       Physical Exam     Initial Vitals [05/22/19 1510]   BP Pulse Resp Temp SpO2   134/73 95 18 99.6 °F (37.6 °C) 99 %      MAP        --         Physical Exam    Nursing note and vitals reviewed.  Constitutional: Vital signs are normal. She appears well-developed and well-nourished. She is not diaphoretic. She is active and cooperative.  Non-toxic appearance. No distress.   HENT:   Mouth/Throat: Uvula is midline, oropharynx is clear and moist and mucous membranes are normal. No trismus in the jaw. Abnormal dentition. No uvula swelling.       Mild swelling and tenderness noted over the left buccal tissue/cheek.  No sublingual swelling, excessive drooling, trismus, fluctuant abscess.   Eyes: EOM are normal. Pupils are equal, round, and reactive to light.   Neck: Neck supple.   Musculoskeletal:        Lumbar back: She exhibits no bony tenderness.   Neurological: She is alert and oriented to person, place, and time.   Skin: Skin is warm. Capillary refill takes less than 2 seconds.         ED Course   Procedures  Labs Reviewed - No data to display       Imaging Results    None          Medical Decision Making:   History:   Old Medical Records: I decided to obtain old medical records.  Initial Assessment:   Pt was seen on 5/08/19 for back pain after fall; plain films were negative for acute findings.  She was treated with NSAIDs and muscle relaxants.  ED Management:  This is an urgent evaluation of a 54 y/o female that presents to the ED with dental pain.  She appears to have pulpitis, most likely a periapical abscess/dental infection.  Pt is afebrile, non-toxic in appearance; no signs of Paulino's angina, airway compromise, facial cellulitis/swelling, sepsis, dehydration, or a fluctuant abscess to drain.  Will discharge with Augmentin and pain control.  Given return precautions and instructed to follow up with a dentist within a week, as scheduled.                        Clinical Impression:       ICD-10-CM ICD-9-CM   1. Acute pulpitis K04.01 522.0   2. Dental infection K04.7 522.4         Disposition:   Disposition: Discharged  Condition:  Stable                        Ana Luisa Talbert, BARNEY  05/22/19 1600

## 2019-05-22 NOTE — ED TRIAGE NOTES
Patient presents to the ED via personal transportation alone. Patient reports pain to upper left quadrant of mouth and welling to the left face x 3 days. Patient denies drainage from gums. Denies fevers. Decay noted to tooth #13.

## 2019-08-23 DIAGNOSIS — Z01.419 ENCOUNTER FOR GYNECOLOGICAL EXAMINATION WITHOUT ABNORMAL FINDING: ICD-10-CM

## 2019-08-23 DIAGNOSIS — R68.82 DECREASED LIBIDO: ICD-10-CM

## 2019-08-23 DIAGNOSIS — N95.1 MENOPAUSAL AND FEMALE CLIMACTERIC STATES: ICD-10-CM

## 2019-08-23 DIAGNOSIS — Z30.431 ENCOUNTER FOR ROUTINE CHECKING OF INTRAUTERINE CONTRACEPTIVE DEVICE: Primary | ICD-10-CM

## 2019-09-04 ENCOUNTER — HOSPITAL ENCOUNTER (OUTPATIENT)
Dept: RADIOLOGY | Facility: HOSPITAL | Age: 54
Discharge: HOME | End: 2019-09-04
Attending: OBSTETRICS & GYNECOLOGY
Payer: MEDICAID

## 2019-09-04 DIAGNOSIS — Z01.419 ENCOUNTER FOR GYNECOLOGICAL EXAMINATION WITHOUT ABNORMAL FINDING: ICD-10-CM

## 2019-09-04 DIAGNOSIS — Z30.431 ENCOUNTER FOR ROUTINE CHECKING OF INTRAUTERINE CONTRACEPTIVE DEVICE: ICD-10-CM

## 2019-09-04 DIAGNOSIS — N95.1 MENOPAUSAL AND FEMALE CLIMACTERIC STATES: ICD-10-CM

## 2019-09-04 DIAGNOSIS — R68.82 DECREASED LIBIDO: ICD-10-CM

## 2019-09-04 PROCEDURE — 76856 US EXAM PELVIC COMPLETE: CPT | Mod: 26,,, | Performed by: RADIOLOGY

## 2019-09-04 PROCEDURE — 76830 TRANSVAGINAL US NON-OB: CPT | Mod: 26,,, | Performed by: RADIOLOGY

## 2019-09-04 PROCEDURE — 76830 TRANSVAGINAL US NON-OB: CPT | Mod: TC

## 2019-09-04 PROCEDURE — 76856 US PELVIS COMP WITH TRANSVAG NON-OB (XPD): ICD-10-PCS | Mod: 26,,, | Performed by: RADIOLOGY

## 2019-09-04 PROCEDURE — 76830 US PELVIS COMP WITH TRANSVAG NON-OB (XPD): ICD-10-PCS | Mod: 26,,, | Performed by: RADIOLOGY

## 2020-03-23 ENCOUNTER — HOSPITAL ENCOUNTER (EMERGENCY)
Facility: HOSPITAL | Age: 55
Discharge: HOME | End: 2020-03-24
Attending: EMERGENCY MEDICINE
Payer: MEDICAID

## 2020-03-23 VITALS
HEIGHT: 63 IN | OXYGEN SATURATION: 98 % | HEART RATE: 87 BPM | BODY MASS INDEX: 35.97 KG/M2 | TEMPERATURE: 99 F | SYSTOLIC BLOOD PRESSURE: 127 MMHG | WEIGHT: 203 LBS | RESPIRATION RATE: 18 BRPM | DIASTOLIC BLOOD PRESSURE: 80 MMHG

## 2020-03-23 DIAGNOSIS — R50.9 ACUTE FEBRILE ILLNESS: Primary | ICD-10-CM

## 2020-03-23 DIAGNOSIS — R05.9 COUGH: ICD-10-CM

## 2020-03-23 DIAGNOSIS — R06.02 SHORTNESS OF BREATH: ICD-10-CM

## 2020-03-23 LAB
ALBUMIN SERPL BCP-MCNC: 3.6 G/DL (ref 3.5–5.2)
ALP SERPL-CCNC: 62 U/L (ref 55–135)
ALT SERPL W/O P-5'-P-CCNC: 28 U/L (ref 10–44)
ANION GAP SERPL CALC-SCNC: 11 MMOL/L (ref 8–16)
AST SERPL-CCNC: 20 U/L (ref 10–40)
B-HCG UR QL: NEGATIVE
BACTERIA #/AREA URNS HPF: ABNORMAL /HPF
BASOPHILS # BLD AUTO: 0 K/UL (ref 0–0.2)
BASOPHILS NFR BLD: 0 % (ref 0–1.9)
BILIRUB SERPL-MCNC: 0.2 MG/DL (ref 0.1–1)
BILIRUB UR QL STRIP: NEGATIVE
BNP SERPL-MCNC: <10 PG/ML (ref 0–99)
BUN SERPL-MCNC: 10 MG/DL (ref 6–20)
CALCIUM SERPL-MCNC: 8.8 MG/DL (ref 8.7–10.5)
CHLORIDE SERPL-SCNC: 104 MMOL/L (ref 95–110)
CLARITY UR: CLEAR
CO2 SERPL-SCNC: 26 MMOL/L (ref 23–29)
COLOR UR: YELLOW
CREAT SERPL-MCNC: 0.8 MG/DL (ref 0.5–1.4)
CTP QC/QA: YES
D DIMER PPP IA.FEU-MCNC: 0.26 MG/L FEU
DIFFERENTIAL METHOD: ABNORMAL
EOSINOPHIL # BLD AUTO: 0 K/UL (ref 0–0.5)
EOSINOPHIL NFR BLD: 0.2 % (ref 0–8)
ERYTHROCYTE [DISTWIDTH] IN BLOOD BY AUTOMATED COUNT: 14.3 % (ref 11.5–14.5)
EST. GFR  (AFRICAN AMERICAN): >60 ML/MIN/1.73 M^2
EST. GFR  (NON AFRICAN AMERICAN): >60 ML/MIN/1.73 M^2
GLUCOSE SERPL-MCNC: 90 MG/DL (ref 70–110)
GLUCOSE UR QL STRIP: NEGATIVE
HCT VFR BLD AUTO: 40.3 % (ref 37–48.5)
HGB BLD-MCNC: 12.9 G/DL (ref 12–16)
HGB UR QL STRIP: ABNORMAL
IMM GRANULOCYTES # BLD AUTO: 0 K/UL (ref 0–0.04)
IMM GRANULOCYTES NFR BLD AUTO: 0 % (ref 0–0.5)
KETONES UR QL STRIP: NEGATIVE
LEUKOCYTE ESTERASE UR QL STRIP: NEGATIVE
LYMPHOCYTES # BLD AUTO: 2.1 K/UL (ref 1–4.8)
LYMPHOCYTES NFR BLD: 51.6 % (ref 18–48)
MCH RBC QN AUTO: 27 PG (ref 27–31)
MCHC RBC AUTO-ENTMCNC: 32 G/DL (ref 32–36)
MCV RBC AUTO: 85 FL (ref 82–98)
MICROSCOPIC COMMENT: ABNORMAL
MONOCYTES # BLD AUTO: 0.4 K/UL (ref 0.3–1)
MONOCYTES NFR BLD: 9.1 % (ref 4–15)
NEUTROPHILS # BLD AUTO: 1.6 K/UL (ref 1.8–7.7)
NEUTROPHILS NFR BLD: 39.1 % (ref 38–73)
NITRITE UR QL STRIP: NEGATIVE
NRBC BLD-RTO: 0 /100 WBC
PH UR STRIP: 5 [PH] (ref 5–8)
PLATELET # BLD AUTO: 198 K/UL (ref 150–350)
PMV BLD AUTO: 10 FL (ref 9.2–12.9)
POTASSIUM SERPL-SCNC: 3.5 MMOL/L (ref 3.5–5.1)
PROT SERPL-MCNC: 7.6 G/DL (ref 6–8.4)
PROT UR QL STRIP: NEGATIVE
RBC # BLD AUTO: 4.77 M/UL (ref 4–5.4)
RBC #/AREA URNS HPF: 3 /HPF (ref 0–4)
SODIUM SERPL-SCNC: 141 MMOL/L (ref 136–145)
SP GR UR STRIP: 1.02 (ref 1–1.03)
SQUAMOUS #/AREA URNS HPF: 5 /HPF
TROPONIN I SERPL DL<=0.01 NG/ML-MCNC: <0.006 NG/ML (ref 0–0.03)
URN SPEC COLLECT METH UR: ABNORMAL
UROBILINOGEN UR STRIP-ACNC: NEGATIVE EU/DL
WBC # BLD AUTO: 4.07 K/UL (ref 3.9–12.7)
WBC #/AREA URNS HPF: 2 /HPF (ref 0–5)

## 2020-03-23 PROCEDURE — 93010 EKG 12-LEAD: ICD-10-PCS | Mod: ,,, | Performed by: INTERNAL MEDICINE

## 2020-03-23 PROCEDURE — 81000 URINALYSIS NONAUTO W/SCOPE: CPT

## 2020-03-23 PROCEDURE — 25000003 PHARM REV CODE 250: Performed by: PHYSICIAN ASSISTANT

## 2020-03-23 PROCEDURE — 93005 ELECTROCARDIOGRAM TRACING: CPT

## 2020-03-23 PROCEDURE — 80053 COMPREHEN METABOLIC PANEL: CPT

## 2020-03-23 PROCEDURE — 99285 EMERGENCY DEPT VISIT HI MDM: CPT | Mod: 25

## 2020-03-23 PROCEDURE — 93010 ELECTROCARDIOGRAM REPORT: CPT | Mod: ,,, | Performed by: INTERNAL MEDICINE

## 2020-03-23 PROCEDURE — 85379 FIBRIN DEGRADATION QUANT: CPT

## 2020-03-23 PROCEDURE — 84484 ASSAY OF TROPONIN QUANT: CPT

## 2020-03-23 PROCEDURE — 85025 COMPLETE CBC W/AUTO DIFF WBC: CPT

## 2020-03-23 PROCEDURE — 83880 ASSAY OF NATRIURETIC PEPTIDE: CPT

## 2020-03-23 PROCEDURE — 81025 URINE PREGNANCY TEST: CPT | Performed by: PHYSICIAN ASSISTANT

## 2020-03-23 RX ORDER — ACETAMINOPHEN 500 MG
1000 TABLET ORAL
Status: COMPLETED | OUTPATIENT
Start: 2020-03-23 | End: 2020-03-23

## 2020-03-23 RX ORDER — PROMETHAZINE HYDROCHLORIDE AND DEXTROMETHORPHAN HYDROBROMIDE 6.25; 15 MG/5ML; MG/5ML
5 SYRUP ORAL NIGHTLY PRN
Qty: 118 ML | Refills: 0 | Status: SHIPPED | OUTPATIENT
Start: 2020-03-23 | End: 2020-04-02

## 2020-03-23 RX ORDER — BENZONATATE 100 MG/1
100 CAPSULE ORAL 3 TIMES DAILY PRN
Qty: 15 CAPSULE | Refills: 0 | Status: SHIPPED | OUTPATIENT
Start: 2020-03-23 | End: 2020-04-02

## 2020-03-23 RX ORDER — ALBUTEROL SULFATE 90 UG/1
1-2 AEROSOL, METERED RESPIRATORY (INHALATION) EVERY 6 HOURS PRN
Qty: 6.7 G | Refills: 0 | Status: SHIPPED | OUTPATIENT
Start: 2020-03-23 | End: 2021-03-23

## 2020-03-23 RX ADMIN — ACETAMINOPHEN 1000 MG: 500 TABLET ORAL at 09:03

## 2020-03-24 NOTE — DISCHARGE INSTRUCTIONS
§ Please return to the Emergency Department for any new or worsening symptoms including: fever, chest pain, shortness of breath, loss of consciousness, dizziness, weakness, or any other concerns.     § Schedule an appointment for follow up with your Primary Care Doctor as soon as possible for a recheck of your symptoms. If you do not have one, you may contact the one listed on your discharge paperwork or you may also call the [facility] Appointment Desk at [phone] to schedule an appointment with one.     § Please take all medication as prescribed.     You have been tested for COVID-19 (CORONAVIRUS). This test takes 3-4 days to come back. It is recommended that you self-quarantine until you get the results of this test.     You should continue isolation until:  At least 3 days (72 hours) have passed since recovery defined as resolution of fever without the use of fever-reducing medications and improvement in respiratory symptoms (cough, shortness of breath, etc), and  At least 7 days have passed since symptoms first appeared.   After the appropriate isolation has passed, he/she may return to normal activities and work.

## 2020-03-24 NOTE — ED TRIAGE NOTES
Pt presents to ED with c/o cough, fever, congestion and SOB x 1 week. Denies n/v/d. Reports loss of smell. Denies taking meds PTA. NAD noted. O2 sats 98% on room air. RR even and unlabored.

## 2020-03-24 NOTE — ED NOTES
"Patient refused SARS- CoV-2 (COVID- 19) QUALITATIVE PCR stating, "I was swabbed at my Doctor's Office today and I don't want to do that again.  It was too uncomfortable."  Statement verbally acknowledged and Provider notified.  "

## 2020-03-24 NOTE — ED NOTES
"Medical screening exam completed.  I have conducted a focused provider triage encounter, findings are as follows:    Brief history of present illness:  54 y.o. female presents to the ED c/o SOB for 2 weeks with associated cough. No contact with COVID-19 positive individual.     Vitals:    03/23/20 1826   BP: 119/74   BP Location: Left arm   Patient Position: Sitting   Pulse: 101   Resp: 18   Temp: (!) 100.9 °F (38.3 °C)   TempSrc: Oral   SpO2: 98%   Weight: 92.1 kg (203 lb)   Height: 5' 3" (1.6 m)       Pertinent physical exam:  Febrile but otherwise well and nontoxic appearing. NAD. Speaking in full and clear sentences.     Brief workup plan:  UPT. CXR. Tylenol ordered.     Preliminary workup initiated; this workup will be continued and followed by the physician or advanced practice provider that is assigned to the patient when roomed.      Bogdan Granados PA-C  03/23/20 2117    "

## 2020-03-24 NOTE — ED PROVIDER NOTES
Encounter Date: 3/23/2020    SCRIBE #1 NOTE: I, Ezequiel Kern, am scribing for, and in the presence of,  Girish Almeida NP. I have scribed the following portions of the note - Other sections scribed: HPI,ROS.       History     Chief Complaint   Patient presents with    Shortness of Breath     pt c/o SOB x 2 weeks; reports having a small dry, nonproductive cough; denies being in contact with anyone positive for COVID-19; pt is febrile in triage but denies having fever     CC:Body aches, Fever, Shortness of breath, Cough     HPI: Lupe Paredes, a 54-year-old female with past medical history of Hypertension presenting for generalized body aches, non productive cough, and fever for one week and half. Patient also complains of shortness of breath with walking, none at rest. She reports she was seen by her primary care physician today and was advised to come to ED for shortness of breath. She denies chest pain, leg pain, leg swelling, or any further complaints. She denies personal or family history of pulmonary embolism. Patient denies any history of premature death secondary to cardiac diease. There are no alleviating or worsening factors.     The history is provided by the patient. No  was used.     Review of patient's allergies indicates:  No Known Allergies  Past Medical History:   Diagnosis Date    Hypertension      History reviewed. No pertinent surgical history.  Family History   Problem Relation Age of Onset    Hypertension Mother     Cancer Mother     HIV Mother     Hypertension Father     Heart disease Father     Hypertension Maternal Grandmother     Alzheimer's disease Maternal Grandmother      Social History     Tobacco Use    Smoking status: Former Smoker    Smokeless tobacco: Never Used   Substance Use Topics    Alcohol use: No    Drug use: No     Review of Systems   Constitutional: Positive for fever. Negative for chills.   HENT: Negative for congestion and trouble  swallowing.    Respiratory: Positive for cough and shortness of breath. Negative for choking, wheezing and stridor.    Cardiovascular: Negative for chest pain, palpitations and leg swelling.   Gastrointestinal: Negative for abdominal pain, nausea and vomiting.   Genitourinary: Negative for difficulty urinating.   Musculoskeletal: Positive for myalgias. Negative for arthralgias.   Skin: Negative for rash and wound.   Neurological: Negative for dizziness, weakness and headaches.   Psychiatric/Behavioral: Negative for confusion.       Physical Exam     Initial Vitals [03/23/20 1826]   BP Pulse Resp Temp SpO2   119/74 101 18 (!) 100.9 °F (38.3 °C) 98 %      MAP       --         Physical Exam    Nursing note and vitals reviewed.  Constitutional: She appears well-developed and well-nourished. She is not diaphoretic. She is cooperative.  Non-toxic appearance. No distress.   HENT:   Head: Normocephalic and atraumatic.   Right Ear: External ear normal.   Left Ear: External ear normal.   Mouth/Throat: No trismus in the jaw.   Eyes: Conjunctivae and EOM are normal.   Neck: Normal range of motion. No tracheal deviation present.   Cardiovascular: Normal rate and regular rhythm.   Pulmonary/Chest: Effort normal and breath sounds normal. No stridor. No tachypnea and no bradypnea. No respiratory distress. She has no wheezes. She has no rhonchi. She has no rales. She exhibits no tenderness.   Speaking full complete sentences.  No stridor, distress, or evidence of air hunger.   Abdominal: Soft. She exhibits no distension and no mass. There is no tenderness. There is no guarding.   Musculoskeletal: Normal range of motion.   Neurological: She is alert and oriented to person, place, and time. She has normal strength. No sensory deficit. Coordination normal. GCS score is 15. GCS eye subscore is 4. GCS verbal subscore is 5. GCS motor subscore is 6.   Skin: Skin is warm, dry and intact. Capillary refill takes less than 2 seconds. No  bruising and no rash noted. No cyanosis or erythema. Nails show no clubbing.   Psychiatric: She has a normal mood and affect. Her behavior is normal. Thought content normal.         ED Course   Procedures  Labs Reviewed   CBC W/ AUTO DIFFERENTIAL - Abnormal; Notable for the following components:       Result Value    Gran # (ANC) 1.6 (*)     Lymph% 51.6 (*)     All other components within normal limits   URINALYSIS, REFLEX TO URINE CULTURE - Abnormal; Notable for the following components:    Occult Blood UA 2+ (*)     All other components within normal limits    Narrative:     Preferred Collection Type->Urine, Clean Catch   URINALYSIS MICROSCOPIC - Abnormal; Notable for the following components:    Bacteria Few (*)     All other components within normal limits    Narrative:     Preferred Collection Type->Urine, Clean Catch   COMPREHENSIVE METABOLIC PANEL   TROPONIN I   B-TYPE NATRIURETIC PEPTIDE   D DIMER, QUANTITATIVE   SARS-COV-2 (COVID-19) QUALITATIVE PCR   POCT URINE PREGNANCY     EKG Readings: (Independently Interpreted)   Initial Reading: No STEMI. Rhythm: Normal Sinus Rhythm. Heart Rate: 88. Ectopy: No Ectopy. Conduction: Normal. ST Segments: Normal ST Segments. T Waves: Normal. Axis: Normal.       Imaging Results          X-Ray Chest 1 View (Final result)  Result time 03/23/20 22:05:50    Final result by Sabra Garcia MD (03/23/20 22:05:50)                 Impression:      Unremarkable one view of the chest.      Electronically signed by: Sabra Garcia  Date:    03/23/2020  Time:    22:05             Narrative:    EXAMINATION:  CHEST ONE VIEW    CLINICAL HISTORY:  Cough    TECHNIQUE:  One view of the chest.    COMPARISON:  01/28/2019    FINDINGS:  The cardiac silhouette is within normal limits.   There is no focal consolidation, pneumothorax, or pleural effusion.                                       APC / Resident Notes:   This is an evaluation of a 54 y.o. female that presents to the Emergency  Department for URI symptoms with shortness of breath. Physical Exam shows a non-toxic, febrile, however overall well appearing female.  Ears and throat without infection.  Rhinorrhea.  Breath sounds clear to auscultation.  No cervical lymphadenopathy or meningeal signs.  Heart regular rhythm without murmurs.  Lower extremities without edema or calf tenderness. Vital signs are reassuring. If available, previous records reviewed. RESULTS:  CBC with no leukocytosis or anemia.  Normal chemistry, troponin, BNP, and D-dimer. Chest x-ray without pneumothorax, pneumonia, or pleural effusion.  EKG sinus rhythm rate of 88 beats per minute.  No evidence of acute ischemia or arrhythmia.  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  Patient advised of the quarantine precautions.    My overall impression is shortness of breath and URI symptoms. I considered, but at this time, do not suspect pneumonia, pneumothorax, pleural effusion, cardiac arrhythmia or ischemia, acute MI, pulmonary embolism.    D/C Information:  Quarantine precautions, hydration, Tylenol p.r.n.. The diagnosis, treatment plan, instructions for follow-up and reevaluation with PCP as well as ED return precautions were discussed and understanding was verbalized. All questions or concerns have been addressed.  ANA M Morales, FNP-C    Progress note:  After have completed my evaluation with the patient including advising her that we were testing her for COVID, nursing staff reports that the patient states she was tested her primary care doctor.  The patient did not advised me this during my encounter or when I advised her that we would test her.  Testing would not change my treatment plan.  Patient is declining a no other test.  I will advise her to follow up with her primary care doctor who did the initial test. ANA M Morales, FNP-C        Scribe Attestation:   Scribe #1: I  performed the above scribed service and the documentation accurately describes the services I performed. I attest to the accuracy of the note.                          Clinical Impression:       ICD-10-CM ICD-9-CM   1. Acute febrile illness R50.9 780.60   2. Cough R05 786.2   3. Shortness of breath R06.02 786.05         Disposition:   Disposition: Discharged  Condition: Stable     ED Disposition Condition    Discharge Stable       DIANE SILVA APRN, ERIN, personally performed the services described in this documentation. All medical record entries made by the scribe were at my direction and in my presence. I have reviewed the chart and agree that the record reflects my personal performance and is accurate and complete.    ED Prescriptions     Medication Sig Dispense Start Date End Date Auth. Provider    benzonatate (TESSALON) 100 MG capsule Take 1 capsule (100 mg total) by mouth 3 (three) times daily as needed for Cough. 15 capsule 3/23/2020 4/2/2020 AGUILA Cragi    promethazine-dextromethorphan (PROMETHAZINE-DM) 6.25-15 mg/5 mL Syrp Take 5 mLs by mouth nightly as needed (Cough). 118 mL 3/23/2020 4/2/2020 AGUILA Craig    albuterol (PROVENTIL/VENTOLIN HFA) 90 mcg/actuation inhaler Inhale 1-2 puffs into the lungs every 6 (six) hours as needed for Wheezing or Shortness of Breath. Rescue 6.7 g 3/23/2020 3/23/2021 AGUILA Craig        Follow-up Information     Follow up With Specialties Details Why Contact Info    Your Primary Care Doctor  Schedule an appointment as soon as possible for a visit  Please call and schedule an appointment for follow up this week.     [facility]  Schedule an appointment as soon as possible for a visit  For Follow-Up, This Week, If you do not have a Primary Care Doctor [address]  [place] [address]  [phone]      [facility] Emergency Medicine Go to  If symptoms worsen [address]  [facility]  [address]  [phone]                                     Girish Almeida, [facility]  03/23/20 2340       Girish Almeida, [facility]  03/24/20 0025

## 2020-05-21 ENCOUNTER — TELEPHONE (OUTPATIENT)
Dept: OBSTETRICS AND GYNECOLOGY | Facility: CLINIC | Age: 55
End: 2020-05-21

## 2020-05-21 DIAGNOSIS — R68.82 DECREASED LIBIDO: Primary | ICD-10-CM

## 2020-05-21 DIAGNOSIS — N95.1 MENOPAUSAL SYMPTOMS: ICD-10-CM

## 2020-05-21 RX ORDER — ESTERIFIED ESTROGEN AND METHYLTESTOSTERONE .625; 1.25 MG/1; MG/1
1 TABLET ORAL DAILY
Qty: 30 TABLET | Refills: 1 | Status: SHIPPED | OUTPATIENT
Start: 2020-05-21 | End: 2020-06-02

## 2020-06-02 ENCOUNTER — CLINICAL SUPPORT (OUTPATIENT)
Dept: OBSTETRICS AND GYNECOLOGY | Facility: CLINIC | Age: 55
End: 2020-06-02
Payer: MEDICAID

## 2020-06-02 ENCOUNTER — OFFICE VISIT (OUTPATIENT)
Dept: OBSTETRICS AND GYNECOLOGY | Facility: CLINIC | Age: 55
End: 2020-06-02
Payer: MEDICAID

## 2020-06-02 VITALS
WEIGHT: 211.88 LBS | BODY MASS INDEX: 37.53 KG/M2 | DIASTOLIC BLOOD PRESSURE: 74 MMHG | SYSTOLIC BLOOD PRESSURE: 129 MMHG

## 2020-06-02 VITALS — BODY MASS INDEX: 37.53 KG/M2 | WEIGHT: 211.88 LBS

## 2020-06-02 DIAGNOSIS — R68.82 DECREASED LIBIDO: ICD-10-CM

## 2020-06-02 DIAGNOSIS — N95.1 MENOPAUSAL SYMPTOMS: Primary | ICD-10-CM

## 2020-06-02 DIAGNOSIS — Z79.890 POSTMENOPAUSAL HRT (HORMONE REPLACEMENT THERAPY): Primary | ICD-10-CM

## 2020-06-02 PROCEDURE — 99999 PR PBB SHADOW E&M-EST. PATIENT-LVL III: ICD-10-PCS | Mod: PBBFAC,,, | Performed by: OBSTETRICS & GYNECOLOGY

## 2020-06-02 PROCEDURE — 99202 OFFICE O/P NEW SF 15 MIN: CPT | Mod: S$PBB,,, | Performed by: OBSTETRICS & GYNECOLOGY

## 2020-06-02 PROCEDURE — 99213 OFFICE O/P EST LOW 20 MIN: CPT | Mod: PBBFAC | Performed by: OBSTETRICS & GYNECOLOGY

## 2020-06-02 PROCEDURE — 99999 PR PBB SHADOW E&M-EST. PATIENT-LVL III: CPT | Mod: PBBFAC,,,

## 2020-06-02 PROCEDURE — 99202 PR OFFICE/OUTPT VISIT, NEW, LEVL II, 15-29 MIN: ICD-10-PCS | Mod: S$PBB,,, | Performed by: OBSTETRICS & GYNECOLOGY

## 2020-06-02 PROCEDURE — 99999 PR PBB SHADOW E&M-EST. PATIENT-LVL III: ICD-10-PCS | Mod: PBBFAC,,,

## 2020-06-02 PROCEDURE — 96372 THER/PROPH/DIAG INJ SC/IM: CPT | Mod: PBBFAC

## 2020-06-02 PROCEDURE — 99999 PR PBB SHADOW E&M-EST. PATIENT-LVL III: CPT | Mod: PBBFAC,,, | Performed by: OBSTETRICS & GYNECOLOGY

## 2020-06-02 RX ORDER — ESTRADIOL VALERATE 20 MG/ML
20 INJECTION INTRAMUSCULAR
Status: COMPLETED | OUTPATIENT
Start: 2020-06-02 | End: 2020-06-02

## 2020-06-02 RX ORDER — TESTOSTERONE CYPIONATE 200 MG/ML
50 INJECTION, SOLUTION INTRAMUSCULAR
Status: COMPLETED | OUTPATIENT
Start: 2020-06-02 | End: 2020-06-02

## 2020-06-02 RX ADMIN — ESTRADIOL VALERATE 20 MG: 20 INJECTION, SOLUTION INTRAMUSCULAR at 01:06

## 2020-06-02 RX ADMIN — TESTOSTERONE CYPIONATE 50 MG: 200 INJECTION, SOLUTION INTRAMUSCULAR at 01:06

## 2020-06-02 NOTE — PROGRESS NOTES
Cc:  Begin HRT injections    HPI:  54 yro menopausal pt who has Mirena IUD due to removal 3/2021 presents to be HRT injections.  Pt is known to me from [facility].  Pt had been using oral E2 which did provide some relief of sx's; however, pt also c/o decreased libido.  Pt was given Rx for Estratest HS; however, pt could not fill Rx due to cost.  Pt desires to try injections.  Pt is up to date w/ gyn care.    Vitals:    06/02/20 1215   BP: 129/74   Weight: 96.1 kg (211 lb 13.8 oz)     Physical Exam   Constitutional: She is oriented to person, place, and time. She appears well-developed and well-nourished. No distress.   HENT:   Head: Normocephalic and atraumatic.   Cardiovascular: Normal rate.   Pulmonary/Chest: Effort normal. No respiratory distress.   Abdominal: Soft. She exhibits no distension. There is no guarding.   Musculoskeletal: Normal range of motion.   Neurological: She is alert and oriented to person, place, and time. No cranial nerve deficit. Coordination normal.   Skin: She is not diaphoretic.   Psychiatric: She has a normal mood and affect. Her behavior is normal. Judgment and thought content normal.   Vitals reviewed.    Assessment/Plan  1. Menopausal symptoms  estradiol valerate (DELESTROGEN) injection 20 mg/mL   2. Decreased libido  testosterone cypionate injection 50 mg     Re-eval sx's in 1 month

## 2020-06-17 ENCOUNTER — HOSPITAL ENCOUNTER (EMERGENCY)
Facility: HOSPITAL | Age: 55
Discharge: HOME | End: 2020-06-17
Attending: EMERGENCY MEDICINE
Payer: MEDICAID

## 2020-06-17 VITALS
HEIGHT: 63 IN | TEMPERATURE: 98 F | WEIGHT: 202 LBS | BODY MASS INDEX: 35.79 KG/M2 | DIASTOLIC BLOOD PRESSURE: 79 MMHG | OXYGEN SATURATION: 100 % | SYSTOLIC BLOOD PRESSURE: 132 MMHG | HEART RATE: 84 BPM | RESPIRATION RATE: 17 BRPM

## 2020-06-17 DIAGNOSIS — M79.605 ACUTE PAIN OF LEFT LOWER EXTREMITY: ICD-10-CM

## 2020-06-17 DIAGNOSIS — L29.9 PRURITUS: ICD-10-CM

## 2020-06-17 DIAGNOSIS — R06.02 SOB (SHORTNESS OF BREATH): Primary | ICD-10-CM

## 2020-06-17 DIAGNOSIS — R07.9 CHEST PAIN: ICD-10-CM

## 2020-06-17 LAB
ALBUMIN SERPL BCP-MCNC: 3.6 G/DL (ref 3.5–5.2)
ALP SERPL-CCNC: 56 U/L (ref 55–135)
ALT SERPL W/O P-5'-P-CCNC: 27 U/L (ref 10–44)
ANION GAP SERPL CALC-SCNC: 6 MMOL/L (ref 8–16)
AST SERPL-CCNC: 20 U/L (ref 10–40)
BASOPHILS # BLD AUTO: 0.02 K/UL (ref 0–0.2)
BASOPHILS NFR BLD: 0.3 % (ref 0–1.9)
BILIRUB SERPL-MCNC: 0.2 MG/DL (ref 0.1–1)
BNP SERPL-MCNC: <10 PG/ML (ref 0–99)
BUN SERPL-MCNC: 10 MG/DL (ref 6–20)
CALCIUM SERPL-MCNC: 9.3 MG/DL (ref 8.7–10.5)
CHLORIDE SERPL-SCNC: 101 MMOL/L (ref 95–110)
CO2 SERPL-SCNC: 31 MMOL/L (ref 23–29)
CREAT SERPL-MCNC: 0.7 MG/DL (ref 0.5–1.4)
D DIMER PPP IA.FEU-MCNC: 0.48 MG/L FEU
DIFFERENTIAL METHOD: ABNORMAL
EOSINOPHIL # BLD AUTO: 0.1 K/UL (ref 0–0.5)
EOSINOPHIL NFR BLD: 1.4 % (ref 0–8)
ERYTHROCYTE [DISTWIDTH] IN BLOOD BY AUTOMATED COUNT: 14.6 % (ref 11.5–14.5)
EST. GFR  (AFRICAN AMERICAN): >60 ML/MIN/1.73 M^2
EST. GFR  (NON AFRICAN AMERICAN): >60 ML/MIN/1.73 M^2
GLUCOSE SERPL-MCNC: 92 MG/DL (ref 70–110)
HCT VFR BLD AUTO: 36.9 % (ref 37–48.5)
HGB BLD-MCNC: 12.1 G/DL (ref 12–16)
IMM GRANULOCYTES # BLD AUTO: 0.02 K/UL (ref 0–0.04)
IMM GRANULOCYTES NFR BLD AUTO: 0.3 % (ref 0–0.5)
LYMPHOCYTES # BLD AUTO: 2.9 K/UL (ref 1–4.8)
LYMPHOCYTES NFR BLD: 40.6 % (ref 18–48)
MCH RBC QN AUTO: 27.8 PG (ref 27–31)
MCHC RBC AUTO-ENTMCNC: 32.8 G/DL (ref 32–36)
MCV RBC AUTO: 85 FL (ref 82–98)
MONOCYTES # BLD AUTO: 0.6 K/UL (ref 0.3–1)
MONOCYTES NFR BLD: 7.9 % (ref 4–15)
NEUTROPHILS # BLD AUTO: 3.6 K/UL (ref 1.8–7.7)
NEUTROPHILS NFR BLD: 49.5 % (ref 38–73)
NRBC BLD-RTO: 0 /100 WBC
PLATELET # BLD AUTO: 252 K/UL (ref 150–350)
PMV BLD AUTO: 9.6 FL (ref 9.2–12.9)
POTASSIUM SERPL-SCNC: 4 MMOL/L (ref 3.5–5.1)
PROT SERPL-MCNC: 7.6 G/DL (ref 6–8.4)
RBC # BLD AUTO: 4.35 M/UL (ref 4–5.4)
SODIUM SERPL-SCNC: 138 MMOL/L (ref 136–145)
TROPONIN I SERPL DL<=0.01 NG/ML-MCNC: <0.006 NG/ML (ref 0–0.03)
WBC # BLD AUTO: 7.21 K/UL (ref 3.9–12.7)

## 2020-06-17 PROCEDURE — 80053 COMPREHEN METABOLIC PANEL: CPT

## 2020-06-17 PROCEDURE — 93010 ELECTROCARDIOGRAM REPORT: CPT | Mod: ,,, | Performed by: INTERNAL MEDICINE

## 2020-06-17 PROCEDURE — 96360 HYDRATION IV INFUSION INIT: CPT

## 2020-06-17 PROCEDURE — 93010 EKG 12-LEAD: ICD-10-PCS | Mod: ,,, | Performed by: INTERNAL MEDICINE

## 2020-06-17 PROCEDURE — 25000003 PHARM REV CODE 250: Performed by: PHYSICIAN ASSISTANT

## 2020-06-17 PROCEDURE — 84484 ASSAY OF TROPONIN QUANT: CPT

## 2020-06-17 PROCEDURE — 85025 COMPLETE CBC W/AUTO DIFF WBC: CPT

## 2020-06-17 PROCEDURE — 99285 EMERGENCY DEPT VISIT HI MDM: CPT | Mod: 25

## 2020-06-17 PROCEDURE — 85379 FIBRIN DEGRADATION QUANT: CPT

## 2020-06-17 PROCEDURE — 93005 ELECTROCARDIOGRAM TRACING: CPT

## 2020-06-17 PROCEDURE — 83880 ASSAY OF NATRIURETIC PEPTIDE: CPT

## 2020-06-17 PROCEDURE — 96361 HYDRATE IV INFUSION ADD-ON: CPT

## 2020-06-17 RX ORDER — ALBUTEROL SULFATE 90 UG/1
1-2 AEROSOL, METERED RESPIRATORY (INHALATION) EVERY 6 HOURS PRN
Qty: 6.7 G | Refills: 0 | Status: SHIPPED | OUTPATIENT
Start: 2020-06-17

## 2020-06-17 RX ORDER — ASPIRIN 325 MG
325 TABLET ORAL
Status: COMPLETED | OUTPATIENT
Start: 2020-06-17 | End: 2020-06-17

## 2020-06-17 RX ADMIN — ASPIRIN 325 MG ORAL TABLET 325 MG: 325 PILL ORAL at 07:06

## 2020-06-17 RX ADMIN — SODIUM CHLORIDE 500 ML: 0.9 INJECTION, SOLUTION INTRAVENOUS at 08:06

## 2020-06-18 NOTE — ED TRIAGE NOTES
"Pt presents to the ED via personal transportation reporting SOB and intermittent midsternal chest pain described as "tightness." Pt reports the SOB has been going on for a month with the chest pain going on x 1 week. Pt denies n/v/d, abdominal pain, loss of taste or smell. Pt AAOx4. In NAD.  "

## 2020-06-18 NOTE — PROVIDER PROGRESS NOTES - EMERGENCY DEPT.
Emergency Department TeleTRIAGE Encounter Note      CHIEF COMPLAINT    Chief Complaint   Patient presents with    Shortness of Breath     Pt C/o SOB 2 months thats has been bothering her over the time, chest tightness, Hx of bronchitis, no cough, no fever.     Rash     Pt c/o rash located on the arms and legs patient feels something had prob bitten her       VITAL SIGNS   Initial Vitals [06/17/20 1915]   BP Pulse Resp Temp SpO2   136/86 84 17 -- --      MAP       --            ALLERGIES    Review of patient's allergies indicates:  No Known Allergies    PROVIDER TRIAGE NOTE  Pt is a 54 yr old female with hx of HTN who presents to the ED with chronic shortness of breath that has been worsening over the last two months.  She denies chest pain.  Denies fever or cough.  Denies lower extremity swelling.  Pt also reports itching that has been worsening over the last month - she is worried something is biting her.  Reports rash to skin that I am not able to evaluate via the teletriage screen.     ORDERS  Labs Reviewed - No data to display    ED Orders (720h ago, onward)    None            Virtual Visit Note: The provider triage portion of this emergency department evaluation and documentation was performed via [employer], a HIPAA-compliant telemedicine application, in concert with a tele-presenter in the room. A face to face patient evaluation with one of my colleagues will occur once the patient is placed in an emergency department room.      DISCLAIMER: This note was prepared with [employer] voice recognition transcription software. Garbled syntax, mangled pronouns, and other bizarre constructions may be attributed to that software system.

## 2020-06-18 NOTE — ED PROVIDER NOTES
Encounter Date: 6/17/2020    SCRIBE #1 NOTE: I, Rosendo Bah, am scribing for, and in the presence of,  Rosendo Smith PA-C. I have scribed the following portions of the note - Other sections scribed: HPI, ROS.       History     Chief Complaint   Patient presents with    Shortness of Breath     Pt C/o SOB 2 months thats has been bothering her over the time, chest tightness, Hx of bronchitis, no cough, no fever.     Rash     Pt c/o rash located on the arms and legs patient feels something had prob bitten her     CC: Shortness of Breath    HPI: This 54 y.o. Female with hypertension presents to the emergency room for an evaluation of mild shortness of breath for the past month. Pt has associated chest tightness that started today. She believes she was bitten and has itching rashes on her arms and L leg. Pt does not have chest tightness now. She complies with hormone therapy for menopause. Pt has no fever, abdominal pain, back pain, nausea, vomiting, diarrhea or incontinence. She has not taken pain medication today. Pt's chest tightness earlier today lead her to the ED.    The history is provided by the patient. No  was used.     Review of patient's allergies indicates:  No Known Allergies  Past Medical History:   Diagnosis Date    Hypertension      History reviewed. No pertinent surgical history.  Family History   Problem Relation Age of Onset    Hypertension Mother     Cancer Mother     HIV Mother     Hypertension Father     Heart disease Father     Hypertension Maternal Grandmother     Alzheimer's disease Maternal Grandmother      Social History     Tobacco Use    Smoking status: Former Smoker    Smokeless tobacco: Never Used   Substance Use Topics    Alcohol use: No    Drug use: No     Review of Systems   Constitutional: Negative for fever.   HENT: Negative for sore throat.    Respiratory: Positive for shortness of breath.    Cardiovascular: Negative for chest pain.    Gastrointestinal: Negative for abdominal pain, diarrhea, nausea and vomiting.   Genitourinary: Negative for dysuria and enuresis.   Musculoskeletal: Negative for back pain.   Skin: Positive for rash.   Neurological: Negative for weakness.   Hematological: Does not bruise/bleed easily.       Physical Exam     Initial Vitals   BP Pulse Resp Temp SpO2   06/17/20 1915 06/17/20 1915 06/17/20 1915 06/17/20 2006 06/17/20 2006   136/86 84 17 99 °F (37.2 °C) 99 %      MAP       --                Physical Exam    Nursing note and vitals reviewed.  Constitutional: She appears well-developed and well-nourished. She is not diaphoretic. No distress.   HENT:   Head: Normocephalic and atraumatic.   Nose: Nose normal.   Mouth/Throat: Oropharynx is clear and moist. No oropharyngeal exudate.   Eyes: Conjunctivae and EOM are normal. Right eye exhibits no discharge. Left eye exhibits no discharge.   Neck: Normal range of motion. No tracheal deviation present. No JVD present.   Cardiovascular: Normal rate, regular rhythm and normal heart sounds. Exam reveals no friction rub.    No murmur heard.  Pulmonary/Chest: Breath sounds normal. No stridor. No respiratory distress. She has no wheezes. She has no rhonchi. She has no rales. She exhibits no tenderness.   Abdominal: Soft. She exhibits no distension. There is no abdominal tenderness. There is no guarding.   Musculoskeletal: Normal range of motion.      Comments: Tenderness with associated swelling to the left calf.  No erythema, fluctuance, induration, or drainage.  Distal lower extremity pulses 2+ and equal.  Ambulatory.   Neurological: She is alert and oriented to person, place, and time.   Skin: Skin is warm and dry. No rash and no abscess noted. No erythema. No pallor.   No discernible rash present         ED Course   Procedures  Labs Reviewed   CBC W/ AUTO DIFFERENTIAL - Abnormal; Notable for the following components:       Result Value    Hematocrit 36.9 (*)     RDW 14.6 (*)      All other components within normal limits   COMPREHENSIVE METABOLIC PANEL - Abnormal; Notable for the following components:    CO2 31 (*)     Anion Gap 6 (*)     All other components within normal limits   TROPONIN I   B-TYPE NATRIURETIC PEPTIDE   D DIMER, QUANTITATIVE    Narrative:     Recoll. [id number] by [facility] at 06/17/2020 21:26, reason: Specimen   hemolyzed, demertia          Imaging Results          US Lower Extremity Veins Left (Final result)  Result time 06/17/20 23:30:53    Final result by Alexandra Gonsalez MD (06/17/20 23:30:53)                 Impression:      No evidence of left lower extremity deep venous thrombosis.      Electronically signed by: Alexandra Gonsalez MD  Date:    06/17/2020  Time:    23:30             Narrative:    EXAMINATION:  US LOWER EXTREMITY VEINS LEFT    CLINICAL HISTORY:  Pain in left leg    TECHNIQUE:  Duplex and color flow Doppler evaluation of the left lower extremity veins was performed.    COMPARISON:  None    FINDINGS:  No evidence of clot involving the bilateral common femoral veins or left greater saphenous, femoral, popliteal, peroneal, anterior and posterior tibial veins.  All venous structures demonstrate normal respiratory phasicity and augment adequately.  No evidence of soft tissue mass or Baker's cyst.                               X-Ray Chest PA And Lateral (Final result)  Result time 06/17/20 20:27:13    Final result by Kaela Maravilla MD (06/17/20 20:27:13)                 Impression:      No acute abnormality.      Electronically signed by: Kaela Maravilla MD  Date:    06/17/2020  Time:    20:27             Narrative:    EXAMINATION:  XR CHEST PA AND LATERAL    CLINICAL HISTORY:  Chest Pain;    TECHNIQUE:  PA and lateral views of the chest were performed.    COMPARISON:  March 23, 2020    FINDINGS:  The lungs are clear, with normal appearance of pulmonary vasculature and no pleural effusion or pneumothorax.    The cardiac silhouette is normal in size. The hilar  and mediastinal contours are unremarkable.    Visualized osseous structures are stable with degenerative changes of the spine and a mild thoracic dextroscoliosis..  There are surgical clips overlying the right upper quadrant of the abdomen.                                 Medical Decision Making:   History:   Old Medical Records: I decided to obtain old medical records.  Independently Interpreted Test(s):   I have ordered and independently interpreted X-rays - see prior notes.  I have ordered and independently interpreted EKG Reading(s) - see prior notes  Clinical Tests:   Lab Tests: Ordered and Reviewed  Radiological Study: Ordered and Reviewed  Medical Tests: Ordered and Reviewed  ED Management:  Patient reports improvement of symptoms while in the ED. Etiology of symptoms is unclear.  Workup is completely unremarkable.  Not consistent with ACS.  Low probability of acute PE.  No signs of heart failure.  No pneumothorax, pneumonia, or widening mediastinum.  No severe anemia.  No significant electrolyte disturbance.  No DVT or Baker cyst.  No overlying cellulitis.  No anaphylaxis or angioedema.    Advising follow-up with PCP. Strict return precautions discussed with patient who is agreeable to the plan.            Scribe Attestation:   Scribe #1: I performed the above scribed service and the documentation accurately describes the services I performed. I attest to the accuracy of the note.                          Clinical Impression:       ICD-10-CM ICD-9-CM   1. SOB (shortness of breath)  R06.02 786.05   2. Chest pain  R07.9 786.50   3. Acute pain of left lower extremity  M79.605 729.5   4. Pruritus  L29.9 698.9             ED Disposition Condition    Discharge Stable        ED Prescriptions     Medication Sig Dispense Start Date End Date Auth. Provider    albuterol (PROVENTIL/VENTOLIN HFA) 90 mcg/actuation inhaler Inhale 1-2 puffs into the lungs every 6 (six) hours as needed for Wheezing. Rescue 6.7 g 6/17/2020   Rosendo Smith PA-C        Follow-up Information     Follow up With Specialties Details Why Contact Info    Rudy Cardoza NP Family Medicine Schedule an appointment as soon as possible for a visit in 1 day For re-evaluation [address]  [facility] [address]  [phone]      [facility] Emergency Medicine Go to  If symptoms worsen [address]  [facility] [address] [phone]                      Scribe attestation: I, Rosendo Smith PA-C, personally performed the services described in this documentation. All medical record entries made by the scribe were at my direction and in my presence.  I have reviewed the chart and agree that the record reflects my personal performance and is accurate and complete.             Rosendo Smith PA-C  06/18/20 0118

## 2020-07-06 ENCOUNTER — CLINICAL SUPPORT (OUTPATIENT)
Dept: OBSTETRICS AND GYNECOLOGY | Facility: CLINIC | Age: 55
End: 2020-07-06
Payer: MEDICAID

## 2020-07-06 ENCOUNTER — OFFICE VISIT (OUTPATIENT)
Dept: OBSTETRICS AND GYNECOLOGY | Facility: CLINIC | Age: 55
End: 2020-07-06
Payer: MEDICAID

## 2020-07-06 VITALS — BODY MASS INDEX: 37.73 KG/M2 | SYSTOLIC BLOOD PRESSURE: 126 MMHG | DIASTOLIC BLOOD PRESSURE: 72 MMHG | WEIGHT: 213 LBS

## 2020-07-06 VITALS — HEIGHT: 63 IN | BODY MASS INDEX: 37.73 KG/M2 | WEIGHT: 212.94 LBS

## 2020-07-06 DIAGNOSIS — R68.82 DECREASED LIBIDO: ICD-10-CM

## 2020-07-06 DIAGNOSIS — Z79.890 POST-MENOPAUSE ON HRT (HORMONE REPLACEMENT THERAPY): Primary | ICD-10-CM

## 2020-07-06 DIAGNOSIS — Z79.890 POSTMENOPAUSAL HRT (HORMONE REPLACEMENT THERAPY): Primary | ICD-10-CM

## 2020-07-06 PROCEDURE — 99213 OFFICE O/P EST LOW 20 MIN: CPT | Mod: S$PBB,,, | Performed by: OBSTETRICS & GYNECOLOGY

## 2020-07-06 PROCEDURE — 99999 PR PBB SHADOW E&M-EST. PATIENT-LVL III: ICD-10-PCS | Mod: PBBFAC,,,

## 2020-07-06 PROCEDURE — 96372 THER/PROPH/DIAG INJ SC/IM: CPT | Mod: PBBFAC

## 2020-07-06 PROCEDURE — 99999 PR PBB SHADOW E&M-EST. PATIENT-LVL III: CPT | Mod: PBBFAC,,, | Performed by: OBSTETRICS & GYNECOLOGY

## 2020-07-06 PROCEDURE — 99999 PR PBB SHADOW E&M-EST. PATIENT-LVL III: ICD-10-PCS | Mod: PBBFAC,,, | Performed by: OBSTETRICS & GYNECOLOGY

## 2020-07-06 PROCEDURE — 99213 PR OFFICE/OUTPT VISIT, EST, LEVL III, 20-29 MIN: ICD-10-PCS | Mod: S$PBB,,, | Performed by: OBSTETRICS & GYNECOLOGY

## 2020-07-06 PROCEDURE — 99213 OFFICE O/P EST LOW 20 MIN: CPT | Mod: PBBFAC,25 | Performed by: OBSTETRICS & GYNECOLOGY

## 2020-07-06 PROCEDURE — 99999 PR PBB SHADOW E&M-EST. PATIENT-LVL III: CPT | Mod: PBBFAC,,,

## 2020-07-06 RX ORDER — ESTRADIOL VALERATE 40 MG/ML
20 INJECTION INTRAMUSCULAR
Status: SHIPPED | OUTPATIENT
Start: 2020-07-06 | End: 2021-07-01

## 2020-07-06 RX ORDER — TESTOSTERONE CYPIONATE 200 MG/ML
50 INJECTION, SOLUTION INTRAMUSCULAR
Status: COMPLETED | OUTPATIENT
Start: 2020-07-06 | End: 2020-12-07

## 2020-07-06 RX ADMIN — ESTRADIOL VALERATE 20 MG: 40 INJECTION INTRAMUSCULAR at 11:07

## 2020-07-06 RX ADMIN — TESTOSTERONE CYPIONATE 50 MG: 200 INJECTION, SOLUTION INTRAMUSCULAR at 11:07

## 2020-07-06 NOTE — PROGRESS NOTES
Pt TOLERATED HRT (DELESTROGEN AND TESTOSTERONE) TO R GLUTEAL WITHOUT COMPLICATIONS NEXT APPOINTMENT GIVEN

## 2020-07-06 NOTE — PROGRESS NOTES
Cc:  F/u HRT    HPI:   54 yro menopausal pt who has Mirena IUD due to removal 3/2021 presents for f/u of HRT injections.  Pt is known to me from [facility].  Pt had been using both injectable E2 and depo-testosterone which has improved menopausal sx's as well as prior c/o of decreased libido.  Pt was given Rx for Estratest HS; however, pt could not fill Rx due to cost.  Pt is up to date w/ gyn care.  Pt desires to continue with monthly E2 and testosterone injections.    Vitals:    07/06/20 1129   BP: 126/72   Weight: 96.6 kg (213 lb)     Physical Exam  Vitals signs reviewed.   Constitutional:       General: She is not in acute distress.     Appearance: Normal appearance. She is normal weight. She is not ill-appearing.   HENT:      Head: Normocephalic.   Neck:      Musculoskeletal: Normal range of motion.   Pulmonary:      Effort: Pulmonary effort is normal. No respiratory distress.   Abdominal:      General: Abdomen is flat. There is no distension.      Palpations: Abdomen is soft.      Tenderness: There is no abdominal tenderness.   Musculoskeletal: Normal range of motion.         General: No swelling or tenderness.   Skin:     General: Skin is warm.   Neurological:      Mental Status: She is alert.   Psychiatric:         Mood and Affect: Mood normal.         Behavior: Behavior normal.         Thought Content: Thought content normal.         Judgment: Judgment normal.           Assessment/Plan  1. Postmenopausal HRT (hormone replacement therapy)  testosterone cypionate injection 50 mg    estradiol valerate injection 20 mg   2. Decreased libido       Cont injections q month until 3/2021 at which time Mirena will need to be removed.  Will either replace or add depoprovera 150 mg IM q 3 months to decrease risk of endometrial hyperplasia.

## 2020-08-06 ENCOUNTER — CLINICAL SUPPORT (OUTPATIENT)
Dept: OBSTETRICS AND GYNECOLOGY | Facility: CLINIC | Age: 55
End: 2020-08-06
Payer: MEDICAID

## 2020-08-06 VITALS — HEIGHT: 63 IN | WEIGHT: 213.88 LBS | BODY MASS INDEX: 37.89 KG/M2

## 2020-08-06 DIAGNOSIS — Z79.890 POST-MENOPAUSE ON HRT (HORMONE REPLACEMENT THERAPY): Primary | ICD-10-CM

## 2020-08-06 PROCEDURE — 99999 PR PBB SHADOW E&M-EST. PATIENT-LVL III: ICD-10-PCS | Mod: PBBFAC,,,

## 2020-08-06 PROCEDURE — 96372 THER/PROPH/DIAG INJ SC/IM: CPT | Mod: PBBFAC

## 2020-08-06 PROCEDURE — 99999 PR PBB SHADOW E&M-EST. PATIENT-LVL III: CPT | Mod: PBBFAC,,,

## 2020-08-06 RX ADMIN — ESTRADIOL VALERATE 20 MG: 40 INJECTION INTRAMUSCULAR at 10:08

## 2020-08-06 RX ADMIN — TESTOSTERONE CYPIONATE 50 MG: 200 INJECTION, SOLUTION INTRAMUSCULAR at 10:08

## 2020-08-06 NOTE — PROGRESS NOTES
HRT injection (delestrogen and depotestosterone) given IM to L/ ventroglut without difficulty. Pt advised to remain in the clinin for 15 minutes to monitor for any immediate adverse reactions.

## 2020-09-08 ENCOUNTER — CLINICAL SUPPORT (OUTPATIENT)
Dept: OBSTETRICS AND GYNECOLOGY | Facility: CLINIC | Age: 55
End: 2020-09-08
Payer: MEDICAID

## 2020-09-08 VITALS — WEIGHT: 203.06 LBS | BODY MASS INDEX: 35.97 KG/M2

## 2020-09-08 DIAGNOSIS — Z79.890 HORMONE REPLACEMENT THERAPY (HRT): Primary | ICD-10-CM

## 2020-09-08 PROCEDURE — 99999 PR PBB SHADOW E&M-EST. PATIENT-LVL III: ICD-10-PCS | Mod: PBBFAC,,,

## 2020-09-08 PROCEDURE — 99999 PR PBB SHADOW E&M-EST. PATIENT-LVL III: CPT | Mod: PBBFAC,,,

## 2020-09-08 PROCEDURE — 96372 THER/PROPH/DIAG INJ SC/IM: CPT | Mod: PBBFAC

## 2020-09-08 RX ADMIN — TESTOSTERONE CYPIONATE 50 MG: 200 INJECTION, SOLUTION INTRAMUSCULAR at 10:09

## 2020-09-08 RX ADMIN — ESTRADIOL VALERATE 20 MG: 40 INJECTION INTRAMUSCULAR at 10:09

## 2020-09-08 NOTE — PROGRESS NOTES
Hormonal replacement therapy (delestrogen & depotestosterone) ,injection given via right gluteal. Information given to patient about menopause & hrt medications. Patient adivised to wait in clinic for 15 minutes to assess for s/s of reaction. Patient verbalized understanding to all things discussed and denies any additional questions at this time. Next appointment made.

## 2020-10-08 ENCOUNTER — CLINICAL SUPPORT (OUTPATIENT)
Dept: OBSTETRICS AND GYNECOLOGY | Facility: CLINIC | Age: 55
End: 2020-10-08
Payer: MEDICAID

## 2020-10-08 VITALS — BODY MASS INDEX: 36.65 KG/M2 | WEIGHT: 206.88 LBS

## 2020-10-08 DIAGNOSIS — Z79.890 HORMONE REPLACEMENT THERAPY (HRT): Primary | ICD-10-CM

## 2020-10-08 PROCEDURE — 99999 PR PBB SHADOW E&M-EST. PATIENT-LVL III: ICD-10-PCS | Mod: PBBFAC,,,

## 2020-10-08 PROCEDURE — 99999 PR PBB SHADOW E&M-EST. PATIENT-LVL III: CPT | Mod: PBBFAC,,,

## 2020-10-08 PROCEDURE — 96372 THER/PROPH/DIAG INJ SC/IM: CPT | Mod: PBBFAC

## 2020-10-08 RX ADMIN — ESTRADIOL VALERATE 20 MG: 40 INJECTION INTRAMUSCULAR at 10:10

## 2020-10-08 RX ADMIN — TESTOSTERONE CYPIONATE 50 MG: 200 INJECTION, SOLUTION INTRAMUSCULAR at 10:10

## 2020-10-08 NOTE — PROGRESS NOTES
Hormonal replacement therapy (delestrogen & depotestosterone) injection given via left gluteal. Information given to patient about menopause & hrt medications. Patient adivised to wait in clinic for 15 minutes to assess for s/s of reaction. Patient verbalized understanding to all things discussed and denies any additional questions at this time. Next appointment made.

## 2020-11-06 ENCOUNTER — CLINICAL SUPPORT (OUTPATIENT)
Dept: OBSTETRICS AND GYNECOLOGY | Facility: CLINIC | Age: 55
End: 2020-11-06
Payer: MEDICAID

## 2020-11-06 ENCOUNTER — OFFICE VISIT (OUTPATIENT)
Dept: OBSTETRICS AND GYNECOLOGY | Facility: CLINIC | Age: 55
End: 2020-11-06
Payer: MEDICAID

## 2020-11-06 VITALS
DIASTOLIC BLOOD PRESSURE: 73 MMHG | SYSTOLIC BLOOD PRESSURE: 126 MMHG | BODY MASS INDEX: 37.74 KG/M2 | HEIGHT: 63 IN | WEIGHT: 213 LBS

## 2020-11-06 VITALS — BODY MASS INDEX: 37.72 KG/M2 | WEIGHT: 212.94 LBS

## 2020-11-06 DIAGNOSIS — R10.2 PELVIC PRESSURE IN FEMALE: Primary | ICD-10-CM

## 2020-11-06 DIAGNOSIS — Z79.890 HORMONE REPLACEMENT THERAPY (HRT): ICD-10-CM

## 2020-11-06 DIAGNOSIS — Z79.890 POST-MENOPAUSE ON HRT (HORMONE REPLACEMENT THERAPY): Primary | ICD-10-CM

## 2020-11-06 PROCEDURE — 96372 THER/PROPH/DIAG INJ SC/IM: CPT | Mod: PBBFAC

## 2020-11-06 PROCEDURE — 99999 PR PBB SHADOW E&M-EST. PATIENT-LVL III: ICD-10-PCS | Mod: PBBFAC,,,

## 2020-11-06 PROCEDURE — 99213 OFFICE O/P EST LOW 20 MIN: CPT | Mod: S$PBB,,, | Performed by: OBSTETRICS & GYNECOLOGY

## 2020-11-06 PROCEDURE — 99999 PR PBB SHADOW E&M-EST. PATIENT-LVL III: CPT | Mod: PBBFAC,,,

## 2020-11-06 PROCEDURE — 99999 PR PBB SHADOW E&M-EST. PATIENT-LVL II: CPT | Mod: PBBFAC,,, | Performed by: OBSTETRICS & GYNECOLOGY

## 2020-11-06 PROCEDURE — 99213 PR OFFICE/OUTPT VISIT, EST, LEVL III, 20-29 MIN: ICD-10-PCS | Mod: S$PBB,,, | Performed by: OBSTETRICS & GYNECOLOGY

## 2020-11-06 PROCEDURE — 99212 OFFICE O/P EST SF 10 MIN: CPT | Mod: PBBFAC,25 | Performed by: OBSTETRICS & GYNECOLOGY

## 2020-11-06 PROCEDURE — 99999 PR PBB SHADOW E&M-EST. PATIENT-LVL II: ICD-10-PCS | Mod: PBBFAC,,, | Performed by: OBSTETRICS & GYNECOLOGY

## 2020-11-06 RX ORDER — ASPIRIN 325 MG
325 TABLET ORAL
COMMUNITY
End: 2022-01-10 | Stop reason: CLARIF

## 2020-11-06 RX ORDER — OMEGA-3 FATTY ACIDS 1000 MG
2 CAPSULE ORAL
COMMUNITY
End: 2022-01-10 | Stop reason: CLARIF

## 2020-11-06 RX ORDER — ERGOCALCIFEROL 1.25 MG/1
50000 CAPSULE ORAL
COMMUNITY
End: 2022-01-10 | Stop reason: CLARIF

## 2020-11-06 RX ORDER — MULTIVITAMIN
1 TABLET ORAL
COMMUNITY

## 2020-11-06 RX ORDER — PHENTERMINE HYDROCHLORIDE 37.5 MG/1
37.5 CAPSULE ORAL EVERY MORNING
COMMUNITY
Start: 2020-08-19 | End: 2022-01-10 | Stop reason: CLARIF

## 2020-11-06 RX ORDER — LISINOPRIL 20 MG/1
20 TABLET ORAL
COMMUNITY
End: 2022-01-10

## 2020-11-06 RX ORDER — LISINOPRIL AND HYDROCHLOROTHIAZIDE 12.5; 2 MG/1; MG/1
1 TABLET ORAL DAILY
COMMUNITY
Start: 2020-10-21

## 2020-11-06 RX ADMIN — ESTRADIOL VALERATE 20 MG: 40 INJECTION INTRAMUSCULAR at 10:11

## 2020-11-06 RX ADMIN — TESTOSTERONE CYPIONATE 50 MG: 200 INJECTION, SOLUTION INTRAMUSCULAR at 10:11

## 2020-11-07 NOTE — PROGRESS NOTES
"Cc:  Possible prolapse    HPI:  55 yro who has mirena IUD presents to discuss possible prolapse.  Pt states she feels something bulging out her vagina.  Pt is currently using HRT of IM estradiol and depotestosterone while she has a Mirena IUD.  Pt is currently due for injections today and pt is happy with current HRT.    Vitals:    11/06/20 0942   BP: 126/73   Weight: 96.6 kg (213 lb)   Height: 5' 3" (1.6 m)     Physical Exam  Vitals signs reviewed. Exam conducted with a chaperone present.   Constitutional:       General: She is not in acute distress.     Appearance: She is not ill-appearing, toxic-appearing or diaphoretic.   HENT:      Head: Normocephalic.   Neck:      Musculoskeletal: Normal range of motion.   Pulmonary:      Effort: Pulmonary effort is normal. No respiratory distress.   Abdominal:      General: Abdomen is flat. There is no distension.      Palpations: There is no mass.      Tenderness: There is no abdominal tenderness. There is no guarding or rebound.      Hernia: No hernia is present.   Genitourinary:     Exam position: Lithotomy position.      Labia:         Right: No rash, tenderness, lesion or injury.         Left: No rash, tenderness, lesion or injury.       Vagina: Normal.      Cervix: Normal.      Uterus: Normal.       Adnexa: Right adnexa normal and left adnexa normal.      Comments: Area of concern to pt is her urethra, but there is no evidence of any significant prolapse  Skin:     General: Skin is warm.   Neurological:      General: No focal deficit present.      Mental Status: She is alert and oriented to person, place, and time.      Cranial Nerves: No cranial nerve deficit.      Sensory: No sensory deficit.   Psychiatric:         Mood and Affect: Mood normal.         Behavior: Behavior normal.         Thought Content: Thought content normal.         Judgment: Judgment normal.     Assessment/Plan  1.  HRT surveillance - cont current meds  2.  Pelvic pressure - no evidence of any " prolapse, pt reassured

## 2020-12-07 ENCOUNTER — CLINICAL SUPPORT (OUTPATIENT)
Dept: OBSTETRICS AND GYNECOLOGY | Facility: CLINIC | Age: 55
End: 2020-12-07
Payer: MEDICAID

## 2020-12-07 VITALS — WEIGHT: 210.63 LBS | BODY MASS INDEX: 37.31 KG/M2

## 2020-12-07 DIAGNOSIS — Z79.890 HORMONE REPLACEMENT THERAPY (HRT): Primary | ICD-10-CM

## 2020-12-07 PROCEDURE — 96372 THER/PROPH/DIAG INJ SC/IM: CPT | Mod: PBBFAC

## 2020-12-07 PROCEDURE — 99999 PR PBB SHADOW E&M-EST. PATIENT-LVL III: CPT | Mod: PBBFAC,,,

## 2020-12-07 PROCEDURE — 99999 PR PBB SHADOW E&M-EST. PATIENT-LVL III: ICD-10-PCS | Mod: PBBFAC,,,

## 2020-12-07 RX ADMIN — TESTOSTERONE CYPIONATE 50 MG: 200 INJECTION, SOLUTION INTRAMUSCULAR at 10:12

## 2020-12-07 RX ADMIN — ESTRADIOL VALERATE 20 MG: 40 INJECTION INTRAMUSCULAR at 10:12

## 2020-12-07 NOTE — PROGRESS NOTES
HRT inj administered without difficulty. Pt instructed to sit in waiting room to monitor for s/s of adverse reaction.

## 2021-01-07 ENCOUNTER — CLINICAL SUPPORT (OUTPATIENT)
Dept: OBSTETRICS AND GYNECOLOGY | Facility: CLINIC | Age: 56
End: 2021-01-07
Payer: MEDICAID

## 2021-01-07 VITALS — WEIGHT: 209.88 LBS | BODY MASS INDEX: 37.18 KG/M2

## 2021-01-07 DIAGNOSIS — Z79.890 HORMONE REPLACEMENT THERAPY (HRT): Primary | ICD-10-CM

## 2021-01-07 PROCEDURE — 96372 THER/PROPH/DIAG INJ SC/IM: CPT | Mod: PBBFAC

## 2021-01-07 PROCEDURE — 99999 PR PBB SHADOW E&M-EST. PATIENT-LVL III: CPT | Mod: PBBFAC,,,

## 2021-01-07 PROCEDURE — 99999 PR PBB SHADOW E&M-EST. PATIENT-LVL III: ICD-10-PCS | Mod: PBBFAC,,,

## 2021-01-07 RX ORDER — TESTOSTERONE CYPIONATE 200 MG/ML
50 INJECTION, SOLUTION INTRAMUSCULAR
Status: COMPLETED | OUTPATIENT
Start: 2021-01-07 | End: 2021-06-08

## 2021-01-07 RX ADMIN — TESTOSTERONE CYPIONATE 50 MG: 200 INJECTION, SOLUTION INTRAMUSCULAR at 10:01

## 2021-01-07 RX ADMIN — ESTRADIOL VALERATE 20 MG: 40 INJECTION INTRAMUSCULAR at 10:01

## 2021-02-04 ENCOUNTER — CLINICAL SUPPORT (OUTPATIENT)
Dept: OBSTETRICS AND GYNECOLOGY | Facility: CLINIC | Age: 56
End: 2021-02-04
Payer: MEDICAID

## 2021-02-04 VITALS — BODY MASS INDEX: 37.06 KG/M2 | WEIGHT: 209.19 LBS

## 2021-02-04 DIAGNOSIS — Z79.890 HORMONE REPLACEMENT THERAPY: Primary | ICD-10-CM

## 2021-02-04 PROCEDURE — 99999 PR PBB SHADOW E&M-EST. PATIENT-LVL III: ICD-10-PCS | Mod: PBBFAC,,,

## 2021-02-04 PROCEDURE — 96372 THER/PROPH/DIAG INJ SC/IM: CPT | Mod: PBBFAC

## 2021-02-04 PROCEDURE — 99999 PR PBB SHADOW E&M-EST. PATIENT-LVL III: CPT | Mod: PBBFAC,,,

## 2021-02-04 RX ADMIN — TESTOSTERONE CYPIONATE 50 MG: 200 INJECTION, SOLUTION INTRAMUSCULAR at 09:02

## 2021-02-04 RX ADMIN — ESTRADIOL VALERATE 20 MG: 40 INJECTION INTRAMUSCULAR at 09:02

## 2021-02-12 ENCOUNTER — TELEPHONE (OUTPATIENT)
Dept: OBSTETRICS AND GYNECOLOGY | Facility: CLINIC | Age: 56
End: 2021-02-12

## 2021-03-05 ENCOUNTER — PROCEDURE VISIT (OUTPATIENT)
Dept: OBSTETRICS AND GYNECOLOGY | Facility: CLINIC | Age: 56
End: 2021-03-05
Payer: MEDICAID

## 2021-03-05 VITALS
WEIGHT: 206.44 LBS | DIASTOLIC BLOOD PRESSURE: 90 MMHG | SYSTOLIC BLOOD PRESSURE: 130 MMHG | BODY MASS INDEX: 36.57 KG/M2

## 2021-03-05 DIAGNOSIS — Z30.432 ENCOUNTER FOR IUD REMOVAL: ICD-10-CM

## 2021-03-05 DIAGNOSIS — N95.0 POSTMENOPAUSAL BLEEDING: ICD-10-CM

## 2021-03-05 DIAGNOSIS — Z79.890 HORMONE REPLACEMENT THERAPY (HRT): Primary | ICD-10-CM

## 2021-03-05 PROCEDURE — 88305 TISSUE EXAM BY PATHOLOGIST: CPT | Performed by: PATHOLOGY

## 2021-03-05 PROCEDURE — 99499 UNLISTED E&M SERVICE: CPT | Mod: S$PBB,,, | Performed by: OBSTETRICS & GYNECOLOGY

## 2021-03-05 PROCEDURE — 58301 PR REMOVE, INTRAUTERINE DEVICE: ICD-10-PCS | Mod: S$PBB,,, | Performed by: OBSTETRICS & GYNECOLOGY

## 2021-03-05 PROCEDURE — 99499 NO LOS: ICD-10-PCS | Mod: S$PBB,,, | Performed by: OBSTETRICS & GYNECOLOGY

## 2021-03-05 PROCEDURE — 58100 PR BIOPSY OF UTERUS LINING: ICD-10-PCS | Mod: S$PBB,59,, | Performed by: OBSTETRICS & GYNECOLOGY

## 2021-03-05 PROCEDURE — 58100 BIOPSY OF UTERUS LINING: CPT | Mod: S$PBB,59,, | Performed by: OBSTETRICS & GYNECOLOGY

## 2021-03-05 PROCEDURE — 88305 TISSUE EXAM BY PATHOLOGIST: ICD-10-PCS | Mod: 26,,, | Performed by: PATHOLOGY

## 2021-03-05 PROCEDURE — 58100 BIOPSY OF UTERUS LINING: CPT | Mod: PBBFAC | Performed by: OBSTETRICS & GYNECOLOGY

## 2021-03-05 PROCEDURE — 58301 REMOVE INTRAUTERINE DEVICE: CPT | Mod: PBBFAC | Performed by: OBSTETRICS & GYNECOLOGY

## 2021-03-05 PROCEDURE — 58301 REMOVE INTRAUTERINE DEVICE: CPT | Mod: S$PBB,,, | Performed by: OBSTETRICS & GYNECOLOGY

## 2021-03-05 PROCEDURE — 88305 TISSUE EXAM BY PATHOLOGIST: CPT | Mod: 26,,, | Performed by: PATHOLOGY

## 2021-03-05 RX ORDER — MEDROXYPROGESTERONE ACETATE 150 MG/ML
150 INJECTION, SUSPENSION INTRAMUSCULAR
Status: DISCONTINUED | OUTPATIENT
Start: 2021-03-05 | End: 2021-11-08

## 2021-03-08 ENCOUNTER — TELEPHONE (OUTPATIENT)
Dept: OBSTETRICS AND GYNECOLOGY | Facility: CLINIC | Age: 56
End: 2021-03-08

## 2021-03-08 ENCOUNTER — CLINICAL SUPPORT (OUTPATIENT)
Dept: OBSTETRICS AND GYNECOLOGY | Facility: CLINIC | Age: 56
End: 2021-03-08
Payer: MEDICAID

## 2021-03-08 VITALS — BODY MASS INDEX: 36.96 KG/M2 | WEIGHT: 208.69 LBS

## 2021-03-08 DIAGNOSIS — Z79.890 HORMONE REPLACEMENT THERAPY (HRT): Primary | ICD-10-CM

## 2021-03-08 PROCEDURE — 99999 PR PBB SHADOW E&M-EST. PATIENT-LVL III: CPT | Mod: PBBFAC,,,

## 2021-03-08 PROCEDURE — 96372 THER/PROPH/DIAG INJ SC/IM: CPT | Mod: PBBFAC

## 2021-03-08 PROCEDURE — 99999 PR PBB SHADOW E&M-EST. PATIENT-LVL III: ICD-10-PCS | Mod: PBBFAC,,,

## 2021-03-08 RX ORDER — ESTRADIOL VALERATE 20 MG/ML
20 INJECTION INTRAMUSCULAR
Status: DISCONTINUED | OUTPATIENT
Start: 2021-03-08 | End: 2021-07-09

## 2021-03-08 RX ADMIN — ESTRADIOL VALERATE 20 MG: 40 INJECTION INTRAMUSCULAR at 02:03

## 2021-03-08 RX ADMIN — TESTOSTERONE CYPIONATE 50 MG: 200 INJECTION, SOLUTION INTRAMUSCULAR at 02:03

## 2021-03-08 RX ADMIN — MEDROXYPROGESTERONE ACETATE 150 MG: 150 INJECTION, SUSPENSION INTRAMUSCULAR at 02:03

## 2021-03-10 LAB
FINAL PATHOLOGIC DIAGNOSIS: NORMAL
GROSS: NORMAL

## 2021-03-15 ENCOUNTER — TELEPHONE (OUTPATIENT)
Dept: OBSTETRICS AND GYNECOLOGY | Facility: CLINIC | Age: 56
End: 2021-03-15

## 2021-03-23 ENCOUNTER — IMMUNIZATION (OUTPATIENT)
Dept: OBSTETRICS AND GYNECOLOGY | Facility: CLINIC | Age: 56
End: 2021-03-23
Payer: MEDICAID

## 2021-03-23 DIAGNOSIS — Z23 NEED FOR VACCINATION: Primary | ICD-10-CM

## 2021-03-23 PROCEDURE — 91300 COVID-19, MRNA, LNP-S, PF, 30 MCG/0.3 ML DOSE VACCINE: CPT | Mod: PBBFAC | Performed by: FAMILY MEDICINE

## 2021-04-06 ENCOUNTER — CLINICAL SUPPORT (OUTPATIENT)
Dept: OBSTETRICS AND GYNECOLOGY | Facility: CLINIC | Age: 56
End: 2021-04-06
Payer: MEDICAID

## 2021-04-06 VITALS — WEIGHT: 204.38 LBS | BODY MASS INDEX: 36.2 KG/M2

## 2021-04-06 DIAGNOSIS — Z79.890 HORMONE REPLACEMENT THERAPY (HRT): Primary | ICD-10-CM

## 2021-04-06 PROCEDURE — 96372 THER/PROPH/DIAG INJ SC/IM: CPT | Mod: PBBFAC

## 2021-04-06 PROCEDURE — 99999 PR PBB SHADOW E&M-EST. PATIENT-LVL III: CPT | Mod: PBBFAC,,,

## 2021-04-06 PROCEDURE — 99999 PR PBB SHADOW E&M-EST. PATIENT-LVL III: ICD-10-PCS | Mod: PBBFAC,,,

## 2021-04-06 RX ADMIN — ESTRADIOL VALERATE 20 MG: 20 INJECTION INTRAMUSCULAR at 03:04

## 2021-04-06 RX ADMIN — TESTOSTERONE CYPIONATE 50 MG: 200 INJECTION, SOLUTION INTRAMUSCULAR at 03:04

## 2021-04-13 ENCOUNTER — IMMUNIZATION (OUTPATIENT)
Dept: OBSTETRICS AND GYNECOLOGY | Facility: CLINIC | Age: 56
End: 2021-04-13
Payer: MEDICAID

## 2021-04-13 DIAGNOSIS — Z23 NEED FOR VACCINATION: Primary | ICD-10-CM

## 2021-04-13 PROCEDURE — 91300 COVID-19, MRNA, LNP-S, PF, 30 MCG/0.3 ML DOSE VACCINE: CPT | Mod: S$GLB,,, | Performed by: FAMILY MEDICINE

## 2021-04-13 PROCEDURE — 0002A COVID-19, MRNA, LNP-S, PF, 30 MCG/0.3 ML DOSE VACCINE: ICD-10-PCS | Mod: CV19,S$GLB,, | Performed by: FAMILY MEDICINE

## 2021-04-13 PROCEDURE — 91300 COVID-19, MRNA, LNP-S, PF, 30 MCG/0.3 ML DOSE VACCINE: ICD-10-PCS | Mod: S$GLB,,, | Performed by: FAMILY MEDICINE

## 2021-04-13 PROCEDURE — 0002A COVID-19, MRNA, LNP-S, PF, 30 MCG/0.3 ML DOSE VACCINE: CPT | Mod: CV19,S$GLB,, | Performed by: FAMILY MEDICINE

## 2021-05-12 ENCOUNTER — CLINICAL SUPPORT (OUTPATIENT)
Dept: OBSTETRICS AND GYNECOLOGY | Facility: CLINIC | Age: 56
End: 2021-05-12
Payer: MEDICAID

## 2021-05-12 VITALS — BODY MASS INDEX: 36.32 KG/M2 | WEIGHT: 205 LBS

## 2021-05-12 DIAGNOSIS — Z79.890 HORMONE REPLACEMENT THERAPY (HRT): Primary | ICD-10-CM

## 2021-05-12 PROCEDURE — 96372 THER/PROPH/DIAG INJ SC/IM: CPT | Mod: PBBFAC

## 2021-05-12 PROCEDURE — 99999 PR PBB SHADOW E&M-EST. PATIENT-LVL III: CPT | Mod: PBBFAC,,,

## 2021-05-12 PROCEDURE — 99999 PR PBB SHADOW E&M-EST. PATIENT-LVL III: ICD-10-PCS | Mod: PBBFAC,,,

## 2021-05-12 RX ADMIN — TESTOSTERONE CYPIONATE 50 MG: 200 INJECTION, SOLUTION INTRAMUSCULAR at 08:05

## 2021-05-12 RX ADMIN — ESTRADIOL VALERATE 20 MG: 40 INJECTION INTRAMUSCULAR at 08:05

## 2021-06-08 ENCOUNTER — CLINICAL SUPPORT (OUTPATIENT)
Dept: OBSTETRICS AND GYNECOLOGY | Facility: CLINIC | Age: 56
End: 2021-06-08
Payer: MEDICAID

## 2021-06-08 VITALS — WEIGHT: 203.69 LBS | BODY MASS INDEX: 36.08 KG/M2

## 2021-06-08 DIAGNOSIS — Z79.890 HORMONE REPLACEMENT THERAPY (HRT): Primary | ICD-10-CM

## 2021-06-08 PROCEDURE — 99999 PR PBB SHADOW E&M-EST. PATIENT-LVL III: CPT | Mod: PBBFAC,,,

## 2021-06-08 PROCEDURE — 96372 THER/PROPH/DIAG INJ SC/IM: CPT | Mod: PBBFAC

## 2021-06-08 PROCEDURE — 99999 PR PBB SHADOW E&M-EST. PATIENT-LVL III: ICD-10-PCS | Mod: PBBFAC,,,

## 2021-06-08 RX ADMIN — ESTRADIOL VALERATE 20 MG: 20 INJECTION INTRAMUSCULAR at 11:06

## 2021-06-08 RX ADMIN — TESTOSTERONE CYPIONATE 50 MG: 200 INJECTION, SOLUTION INTRAMUSCULAR at 11:06

## 2021-06-08 RX ADMIN — MEDROXYPROGESTERONE ACETATE 150 MG: 150 INJECTION, SUSPENSION INTRAMUSCULAR at 11:06

## 2021-07-09 ENCOUNTER — CLINICAL SUPPORT (OUTPATIENT)
Dept: OBSTETRICS AND GYNECOLOGY | Facility: CLINIC | Age: 56
End: 2021-07-09
Payer: MEDICAID

## 2021-07-09 VITALS — BODY MASS INDEX: 35.88 KG/M2 | HEIGHT: 63 IN | WEIGHT: 202.5 LBS

## 2021-07-09 DIAGNOSIS — Z79.890 HORMONE REPLACEMENT THERAPY (HRT): Primary | ICD-10-CM

## 2021-07-09 PROCEDURE — 99999 PR PBB SHADOW E&M-EST. PATIENT-LVL III: CPT | Mod: PBBFAC,,,

## 2021-07-09 PROCEDURE — 99999 PR PBB SHADOW E&M-EST. PATIENT-LVL III: ICD-10-PCS | Mod: PBBFAC,,,

## 2021-07-09 PROCEDURE — 96372 THER/PROPH/DIAG INJ SC/IM: CPT | Mod: PBBFAC

## 2021-07-09 RX ORDER — ESTRADIOL VALERATE 40 MG/ML
20 INJECTION INTRAMUSCULAR
Status: DISCONTINUED | OUTPATIENT
Start: 2021-07-09 | End: 2021-09-08

## 2021-07-09 RX ORDER — TESTOSTERONE CYPIONATE 200 MG/ML
50 INJECTION, SOLUTION INTRAMUSCULAR
Status: DISCONTINUED | OUTPATIENT
Start: 2021-07-09 | End: 2021-09-08

## 2021-07-09 RX ADMIN — TESTOSTERONE CYPIONATE 50 MG: 200 INJECTION, SOLUTION INTRAMUSCULAR at 09:07

## 2021-07-09 RX ADMIN — ESTRADIOL VALERATE 20 MG: 40 INJECTION INTRAMUSCULAR at 09:07

## 2021-08-03 ENCOUNTER — CLINICAL SUPPORT (OUTPATIENT)
Dept: OBSTETRICS AND GYNECOLOGY | Facility: CLINIC | Age: 56
End: 2021-08-03
Payer: MEDICAID

## 2021-08-03 DIAGNOSIS — Z79.890 HORMONE REPLACEMENT THERAPY (HRT): Primary | ICD-10-CM

## 2021-08-03 PROCEDURE — 96372 THER/PROPH/DIAG INJ SC/IM: CPT | Mod: PBBFAC

## 2021-08-03 PROCEDURE — 99999 PR PBB SHADOW E&M-EST. PATIENT-LVL III: ICD-10-PCS | Mod: PBBFAC,,,

## 2021-08-03 PROCEDURE — 99999 PR PBB SHADOW E&M-EST. PATIENT-LVL III: CPT | Mod: PBBFAC,,,

## 2021-08-03 RX ADMIN — TESTOSTERONE CYPIONATE 50 MG: 200 INJECTION, SOLUTION INTRAMUSCULAR at 09:08

## 2021-08-03 RX ADMIN — ESTRADIOL VALERATE 20 MG: 40 INJECTION INTRAMUSCULAR at 09:08

## 2021-08-25 ENCOUNTER — TELEPHONE (OUTPATIENT)
Dept: OBSTETRICS AND GYNECOLOGY | Facility: CLINIC | Age: 56
End: 2021-08-25

## 2021-09-08 ENCOUNTER — CLINICAL SUPPORT (OUTPATIENT)
Dept: OBSTETRICS AND GYNECOLOGY | Facility: CLINIC | Age: 56
End: 2021-09-08
Payer: MEDICAID

## 2021-09-08 VITALS — WEIGHT: 203.63 LBS | BODY MASS INDEX: 36.07 KG/M2

## 2021-09-08 DIAGNOSIS — Z30.42 ENCOUNTER FOR MANAGEMENT AND INJECTION OF DEPO-PROVERA: Primary | ICD-10-CM

## 2021-09-08 PROCEDURE — 96372 THER/PROPH/DIAG INJ SC/IM: CPT | Mod: PBBFAC

## 2021-09-08 RX ADMIN — MEDROXYPROGESTERONE ACETATE 150 MG: 150 INJECTION, SUSPENSION INTRAMUSCULAR at 10:09

## 2021-09-20 ENCOUNTER — TELEPHONE (OUTPATIENT)
Dept: OBSTETRICS AND GYNECOLOGY | Facility: CLINIC | Age: 56
End: 2021-09-20

## 2021-09-24 ENCOUNTER — TELEPHONE (OUTPATIENT)
Dept: OBSTETRICS AND GYNECOLOGY | Facility: CLINIC | Age: 56
End: 2021-09-24

## 2021-09-27 ENCOUNTER — OFFICE VISIT (OUTPATIENT)
Dept: OBSTETRICS AND GYNECOLOGY | Facility: CLINIC | Age: 56
End: 2021-09-27
Payer: MEDICAID

## 2021-09-27 VITALS
SYSTOLIC BLOOD PRESSURE: 142 MMHG | DIASTOLIC BLOOD PRESSURE: 92 MMHG | HEIGHT: 63 IN | WEIGHT: 205.5 LBS | BODY MASS INDEX: 36.41 KG/M2

## 2021-09-27 DIAGNOSIS — Z01.419 GYNECOLOGIC EXAM NORMAL: Primary | ICD-10-CM

## 2021-09-27 DIAGNOSIS — N92.1 BREAKTHROUGH BLEEDING ON DEPO PROVERA: ICD-10-CM

## 2021-09-27 PROCEDURE — 99999 PR PBB SHADOW E&M-EST. PATIENT-LVL III: CPT | Mod: PBBFAC,,, | Performed by: OBSTETRICS & GYNECOLOGY

## 2021-09-27 PROCEDURE — 99396 PREV VISIT EST AGE 40-64: CPT | Mod: S$PBB,,, | Performed by: OBSTETRICS & GYNECOLOGY

## 2021-09-27 PROCEDURE — 99213 OFFICE O/P EST LOW 20 MIN: CPT | Mod: PBBFAC | Performed by: OBSTETRICS & GYNECOLOGY

## 2021-09-27 PROCEDURE — 99396 PR PREVENTIVE VISIT,EST,40-64: ICD-10-PCS | Mod: S$PBB,,, | Performed by: OBSTETRICS & GYNECOLOGY

## 2021-09-27 PROCEDURE — 88175 CYTOPATH C/V AUTO FLUID REDO: CPT | Performed by: OBSTETRICS & GYNECOLOGY

## 2021-09-27 PROCEDURE — 87624 HPV HI-RISK TYP POOLED RSLT: CPT | Performed by: OBSTETRICS & GYNECOLOGY

## 2021-09-27 PROCEDURE — 99999 PR PBB SHADOW E&M-EST. PATIENT-LVL III: ICD-10-PCS | Mod: PBBFAC,,, | Performed by: OBSTETRICS & GYNECOLOGY

## 2021-09-27 RX ORDER — ESTRADIOL 2 MG/1
2 TABLET ORAL DAILY
Qty: 21 TABLET | Refills: 0 | Status: SHIPPED | OUTPATIENT
Start: 2021-09-27 | End: 2021-10-22 | Stop reason: SDUPTHER

## 2021-10-04 ENCOUNTER — TELEPHONE (OUTPATIENT)
Dept: OBSTETRICS AND GYNECOLOGY | Facility: CLINIC | Age: 56
End: 2021-10-04

## 2021-10-21 ENCOUNTER — TELEPHONE (OUTPATIENT)
Dept: OBSTETRICS AND GYNECOLOGY | Facility: CLINIC | Age: 56
End: 2021-10-21

## 2021-10-22 ENCOUNTER — TELEPHONE (OUTPATIENT)
Dept: OBSTETRICS AND GYNECOLOGY | Facility: CLINIC | Age: 56
End: 2021-10-22

## 2021-10-22 DIAGNOSIS — N92.1 BREAKTHROUGH BLEEDING ON DEPO PROVERA: ICD-10-CM

## 2021-10-22 RX ORDER — ESTRADIOL 2 MG/1
2 TABLET ORAL DAILY
Qty: 21 TABLET | Refills: 0 | Status: SHIPPED | OUTPATIENT
Start: 2021-10-22 | End: 2022-01-10 | Stop reason: CLARIF

## 2021-11-05 ENCOUNTER — TELEPHONE (OUTPATIENT)
Dept: OBSTETRICS AND GYNECOLOGY | Facility: CLINIC | Age: 56
End: 2021-11-05
Payer: MEDICAID

## 2021-11-08 ENCOUNTER — OFFICE VISIT (OUTPATIENT)
Dept: OBSTETRICS AND GYNECOLOGY | Facility: CLINIC | Age: 56
End: 2021-11-08
Payer: MEDICAID

## 2021-11-08 VITALS
BODY MASS INDEX: 36.24 KG/M2 | DIASTOLIC BLOOD PRESSURE: 84 MMHG | SYSTOLIC BLOOD PRESSURE: 126 MMHG | WEIGHT: 204.56 LBS

## 2021-11-08 DIAGNOSIS — N92.4 ABNORMAL PERIMENOPAUSAL BLEEDING: Primary | ICD-10-CM

## 2021-11-08 PROCEDURE — 99213 OFFICE O/P EST LOW 20 MIN: CPT | Mod: PBBFAC | Performed by: OBSTETRICS & GYNECOLOGY

## 2021-11-08 PROCEDURE — 99999 PR PBB SHADOW E&M-EST. PATIENT-LVL III: ICD-10-PCS | Mod: PBBFAC,,, | Performed by: OBSTETRICS & GYNECOLOGY

## 2021-11-08 PROCEDURE — 99213 PR OFFICE/OUTPT VISIT, EST, LEVL III, 20-29 MIN: ICD-10-PCS | Mod: S$PBB,,, | Performed by: OBSTETRICS & GYNECOLOGY

## 2021-11-08 PROCEDURE — 99999 PR PBB SHADOW E&M-EST. PATIENT-LVL III: CPT | Mod: PBBFAC,,, | Performed by: OBSTETRICS & GYNECOLOGY

## 2021-11-08 PROCEDURE — 99213 OFFICE O/P EST LOW 20 MIN: CPT | Mod: S$PBB,,, | Performed by: OBSTETRICS & GYNECOLOGY

## 2021-11-08 RX ORDER — MEGESTROL ACETATE 40 MG/1
40 TABLET ORAL DAILY
Qty: 90 TABLET | Refills: 0 | Status: SHIPPED | OUTPATIENT
Start: 2021-11-08 | End: 2022-01-10 | Stop reason: CLARIF

## 2021-12-01 ENCOUNTER — CLINICAL SUPPORT (OUTPATIENT)
Dept: OBSTETRICS AND GYNECOLOGY | Facility: CLINIC | Age: 56
End: 2021-12-01
Payer: MEDICAID

## 2021-12-01 VITALS — WEIGHT: 203.25 LBS | BODY MASS INDEX: 36.01 KG/M2

## 2021-12-06 ENCOUNTER — TELEPHONE (OUTPATIENT)
Dept: OBSTETRICS AND GYNECOLOGY | Facility: CLINIC | Age: 56
End: 2021-12-06
Payer: MEDICAID

## 2021-12-06 DIAGNOSIS — N92.1 MENORRHAGIA WITH IRREGULAR CYCLE: ICD-10-CM

## 2021-12-06 DIAGNOSIS — N92.4 ABNORMAL PERIMENOPAUSAL BLEEDING: Primary | ICD-10-CM

## 2022-01-10 ENCOUNTER — OFFICE VISIT (OUTPATIENT)
Dept: OBSTETRICS AND GYNECOLOGY | Facility: CLINIC | Age: 57
End: 2022-01-10
Payer: MEDICAID

## 2022-01-10 ENCOUNTER — ANESTHESIA EVENT (OUTPATIENT)
Dept: SURGERY | Facility: HOSPITAL | Age: 57
End: 2022-01-10
Payer: MEDICAID

## 2022-01-10 ENCOUNTER — HOSPITAL ENCOUNTER (OUTPATIENT)
Dept: PREADMISSION TESTING | Facility: HOSPITAL | Age: 57
Discharge: HOME | End: 2022-01-10
Attending: OBSTETRICS & GYNECOLOGY
Payer: MEDICAID

## 2022-01-10 VITALS
RESPIRATION RATE: 18 BRPM | DIASTOLIC BLOOD PRESSURE: 94 MMHG | HEIGHT: 63 IN | BODY MASS INDEX: 42.52 KG/M2 | OXYGEN SATURATION: 100 % | TEMPERATURE: 98 F | WEIGHT: 240 LBS | SYSTOLIC BLOOD PRESSURE: 140 MMHG

## 2022-01-10 VITALS
HEIGHT: 63 IN | WEIGHT: 240 LBS | DIASTOLIC BLOOD PRESSURE: 70 MMHG | SYSTOLIC BLOOD PRESSURE: 140 MMHG | BODY MASS INDEX: 42.52 KG/M2

## 2022-01-10 DIAGNOSIS — N92.1 MENORRHAGIA WITH IRREGULAR CYCLE: ICD-10-CM

## 2022-01-10 DIAGNOSIS — Z01.818 PRE-OP EXAM: ICD-10-CM

## 2022-01-10 DIAGNOSIS — N92.1 BREAKTHROUGH BLEEDING ON DEPO PROVERA: ICD-10-CM

## 2022-01-10 DIAGNOSIS — N92.4 ABNORMAL PERIMENOPAUSAL BLEEDING: Primary | ICD-10-CM

## 2022-01-10 DIAGNOSIS — N92.4 ABNORMAL PERIMENOPAUSAL BLEEDING: ICD-10-CM

## 2022-01-10 LAB
ALBUMIN SERPL BCP-MCNC: 3.5 G/DL (ref 3.5–5.2)
ALP SERPL-CCNC: 52 U/L (ref 55–135)
ALT SERPL W/O P-5'-P-CCNC: 16 U/L (ref 10–44)
ANION GAP SERPL CALC-SCNC: 7 MMOL/L (ref 8–16)
AST SERPL-CCNC: 12 U/L (ref 10–40)
BASOPHILS # BLD AUTO: ABNORMAL K/UL (ref 0–0.2)
BASOPHILS NFR BLD: 1 % (ref 0–1.9)
BILIRUB SERPL-MCNC: 0.2 MG/DL (ref 0.1–1)
BUN SERPL-MCNC: 10 MG/DL (ref 6–20)
CALCIUM SERPL-MCNC: 9.2 MG/DL (ref 8.7–10.5)
CHLORIDE SERPL-SCNC: 108 MMOL/L (ref 95–110)
CO2 SERPL-SCNC: 26 MMOL/L (ref 23–29)
CREAT SERPL-MCNC: 0.8 MG/DL (ref 0.5–1.4)
DIFFERENTIAL METHOD: ABNORMAL
EOSINOPHIL # BLD AUTO: ABNORMAL K/UL (ref 0–0.5)
EOSINOPHIL NFR BLD: 3 % (ref 0–8)
ERYTHROCYTE [DISTWIDTH] IN BLOOD BY AUTOMATED COUNT: 14.6 % (ref 11.5–14.5)
EST. GFR  (AFRICAN AMERICAN): >60 ML/MIN/1.73 M^2
EST. GFR  (NON AFRICAN AMERICAN): >60 ML/MIN/1.73 M^2
ESTIMATED AVG GLUCOSE: 108 MG/DL (ref 68–131)
GLUCOSE SERPL-MCNC: 90 MG/DL (ref 70–110)
HBA1C MFR BLD: 5.4 % (ref 4–5.6)
HCT VFR BLD AUTO: 38.6 % (ref 37–48.5)
HGB BLD-MCNC: 12.5 G/DL (ref 12–16)
IMM GRANULOCYTES # BLD AUTO: ABNORMAL K/UL (ref 0–0.04)
IMM GRANULOCYTES NFR BLD AUTO: ABNORMAL % (ref 0–0.5)
LYMPHOCYTES # BLD AUTO: ABNORMAL K/UL (ref 1–4.8)
LYMPHOCYTES NFR BLD: 57 % (ref 18–48)
MCH RBC QN AUTO: 27.5 PG (ref 27–31)
MCHC RBC AUTO-ENTMCNC: 32.4 G/DL (ref 32–36)
MCV RBC AUTO: 85 FL (ref 82–98)
MONOCYTES # BLD AUTO: ABNORMAL K/UL (ref 0.3–1)
MONOCYTES NFR BLD: 6 % (ref 4–15)
NEUTROPHILS NFR BLD: 29 % (ref 38–73)
NEUTS BAND NFR BLD MANUAL: 4 %
NRBC BLD-RTO: 0 /100 WBC
PLATELET # BLD AUTO: 232 K/UL (ref 150–450)
PMV BLD AUTO: 9.3 FL (ref 9.2–12.9)
POTASSIUM SERPL-SCNC: 3.6 MMOL/L (ref 3.5–5.1)
PROT SERPL-MCNC: 7.6 G/DL (ref 6–8.4)
RBC # BLD AUTO: 4.54 M/UL (ref 4–5.4)
SODIUM SERPL-SCNC: 141 MMOL/L (ref 136–145)
WBC # BLD AUTO: 5.63 K/UL (ref 3.9–12.7)

## 2022-01-10 PROCEDURE — 3078F PR MOST RECENT DIASTOLIC BLOOD PRESSURE < 80 MM HG: ICD-10-PCS | Mod: CPTII,,, | Performed by: OBSTETRICS & GYNECOLOGY

## 2022-01-10 PROCEDURE — 3008F BODY MASS INDEX DOCD: CPT | Mod: CPTII,,, | Performed by: OBSTETRICS & GYNECOLOGY

## 2022-01-10 PROCEDURE — 93010 ELECTROCARDIOGRAM REPORT: CPT | Mod: ,,, | Performed by: INTERNAL MEDICINE

## 2022-01-10 PROCEDURE — 80053 COMPREHEN METABOLIC PANEL: CPT | Performed by: OBSTETRICS & GYNECOLOGY

## 2022-01-10 PROCEDURE — 99213 OFFICE O/P EST LOW 20 MIN: CPT | Mod: PBBFAC | Performed by: OBSTETRICS & GYNECOLOGY

## 2022-01-10 PROCEDURE — 1159F MED LIST DOCD IN RCRD: CPT | Mod: CPTII,,, | Performed by: OBSTETRICS & GYNECOLOGY

## 2022-01-10 PROCEDURE — 85007 BL SMEAR W/DIFF WBC COUNT: CPT | Performed by: OBSTETRICS & GYNECOLOGY

## 2022-01-10 PROCEDURE — 3077F PR MOST RECENT SYSTOLIC BLOOD PRESSURE >= 140 MM HG: ICD-10-PCS | Mod: CPTII,,, | Performed by: OBSTETRICS & GYNECOLOGY

## 2022-01-10 PROCEDURE — 85027 COMPLETE CBC AUTOMATED: CPT | Performed by: OBSTETRICS & GYNECOLOGY

## 2022-01-10 PROCEDURE — 3078F DIAST BP <80 MM HG: CPT | Mod: CPTII,,, | Performed by: OBSTETRICS & GYNECOLOGY

## 2022-01-10 PROCEDURE — 99499 UNLISTED E&M SERVICE: CPT | Mod: S$PBB,,, | Performed by: OBSTETRICS & GYNECOLOGY

## 2022-01-10 PROCEDURE — 99499 NO LOS: ICD-10-PCS | Mod: S$PBB,,, | Performed by: OBSTETRICS & GYNECOLOGY

## 2022-01-10 PROCEDURE — 1159F PR MEDICATION LIST DOCUMENTED IN MEDICAL RECORD: ICD-10-PCS | Mod: CPTII,,, | Performed by: OBSTETRICS & GYNECOLOGY

## 2022-01-10 PROCEDURE — 99999 PR PBB SHADOW E&M-EST. PATIENT-LVL III: ICD-10-PCS | Mod: PBBFAC,,, | Performed by: OBSTETRICS & GYNECOLOGY

## 2022-01-10 PROCEDURE — 99999 PR PBB SHADOW E&M-EST. PATIENT-LVL III: CPT | Mod: PBBFAC,,, | Performed by: OBSTETRICS & GYNECOLOGY

## 2022-01-10 PROCEDURE — 83036 HEMOGLOBIN GLYCOSYLATED A1C: CPT | Performed by: OBSTETRICS & GYNECOLOGY

## 2022-01-10 PROCEDURE — 93005 ELECTROCARDIOGRAM TRACING: CPT

## 2022-01-10 PROCEDURE — 3077F SYST BP >= 140 MM HG: CPT | Mod: CPTII,,, | Performed by: OBSTETRICS & GYNECOLOGY

## 2022-01-10 PROCEDURE — 93010 EKG 12-LEAD: ICD-10-PCS | Mod: ,,, | Performed by: INTERNAL MEDICINE

## 2022-01-10 PROCEDURE — 36415 COLL VENOUS BLD VENIPUNCTURE: CPT | Performed by: OBSTETRICS & GYNECOLOGY

## 2022-01-10 PROCEDURE — 3008F PR BODY MASS INDEX (BMI) DOCUMENTED: ICD-10-PCS | Mod: CPTII,,, | Performed by: OBSTETRICS & GYNECOLOGY

## 2022-01-10 RX ORDER — MULTIVIT WITH IRON,MINERALS
100 TABLET ORAL NIGHTLY
COMMUNITY

## 2022-01-10 RX ORDER — CHOLECALCIFEROL (VITAMIN D3) 50 MCG
TABLET ORAL DAILY
COMMUNITY

## 2022-01-10 RX ORDER — SODIUM CHLORIDE, SODIUM LACTATE, POTASSIUM CHLORIDE, CALCIUM CHLORIDE 600; 310; 30; 20 MG/100ML; MG/100ML; MG/100ML; MG/100ML
INJECTION, SOLUTION INTRAVENOUS CONTINUOUS
Status: CANCELLED | OUTPATIENT
Start: 2022-01-10

## 2022-01-10 RX ORDER — MUPIROCIN 20 MG/G
OINTMENT TOPICAL
Status: CANCELLED | OUTPATIENT
Start: 2022-01-10

## 2022-01-10 NOTE — ANESTHESIA PREPROCEDURE EVALUATION
01/10/2022  Lupe Paredes is a 56 y.o., female scheduled for HYSTERECTOMY, TOTAL, LAPAROSCOPIC (N/A Abdomen) on 1/19/2022.    Past Medical History:   Diagnosis Date    Hypertension      No past surgical history on file.      Pre-operative evaluation for Procedure(s) (LRB):  HYSTERECTOMY, TOTAL, LAPAROSCOPIC (N/A)    Lupe Paredes is a 56 y.o. female     Patient Active Problem List   Diagnosis    Bronchitis    Menopausal symptoms    Decreased libido       Review of patient's allergies indicates:  No Known Allergies    No current facility-administered medications on file prior to encounter.     Current Outpatient Medications on File Prior to Encounter   Medication Sig Dispense Refill    albuterol (PROVENTIL/VENTOLIN HFA) 90 mcg/actuation inhaler Inhale 1-2 puffs into the lungs every 6 (six) hours as needed for Wheezing. Rescue 6.7 g 0    cetirizine (ZYRTEC) 10 MG tablet Take 1 tablet (10 mg total) by mouth daily as needed (runny nose/congestion). 20 tablet 0    lisinopriL-hydrochlorothiazide (PRINZIDE,ZESTORETIC) 20-12.5 mg per tablet Take 1 tablet by mouth once daily.      multivitamin (THERAGRAN) per tablet Take 1 tablet by mouth.      aspirin (ECOTRIN) 81 MG EC tablet Take 81 mg by mouth once daily.      fish oil-omega-3 fatty acids 300-1,000 mg capsule Take 2 g by mouth once daily.         Anesthesia Evaluation    I have reviewed the Patient Summary Reports.    I have reviewed the Nursing Notes. I have reviewed the NPO Status.   I have reviewed the Medications.     Review of Systems  Anesthesia Hx:  No problems with previous Anesthesia Patient unsure if she had surgery but based off CT in 2017- cholesytectomy Denies Family Hx of Anesthesia complications.   Denies Personal Hx of Anesthesia complications.   Social:  Former Smoker, Social Alcohol Use    Hematology/Oncology:  Hematology Normal    Oncology Normal     EENT/Dental:EENT/Dental Normal   Cardiovascular:   Exercise tolerance: good Hypertension  Denies Angina.      Functional Capacity good / => 4 METS    Pulmonary:  Pulmonary Normal    Renal/:  Renal/ Normal     Hepatic/GI:  Hepatic/GI Normal    Musculoskeletal:  Musculoskeletal Normal    Neurological:  Neurology Normal    Endocrine:  Metabolic Disorders, Obesity / BMI > 30  Dermatological:  Skin Normal    Psych:  Psychiatric Normal           Physical Exam  General:  Morbid Obesity    Airway/Jaw/Neck:  Airway Findings: Mouth Opening: Normal Tongue: Normal  General Airway Assessment: Adult  Jaw/Neck Findings:  Neck ROM: Normal ROM     Eyes/Ears/Nose:  EYES/EARS/NOSE FINDINGS: Normal   Dental:  Dental Findings: In tact   Chest/Lungs:  Chest/Lungs Findings: Normal Respiratory Rate     Heart/Vascular:  Heart Findings: Rate: Normal        Mental Status:  Mental Status Findings:  Cooperative, Alert and Oriented         Anesthesia Plan  Type of Anesthesia, risks & benefits discussed:  Anesthesia Type:  general    Patient's Preference:   Plan Factors:          Intra-op Monitoring Plan: standard ASA monitors  Intra-op Monitoring Plan Comments:   Post Op Pain Control Plan: per primary service following discharge from PACU  Post Op Pain Control Plan Comments:     Induction:   IV  Beta Blocker:  Patient is not currently on a Beta-Blocker (No further documentation required).       Informed Consent: Patient understands risks and agrees with Anesthesia plan.  Questions answered. Anesthesia consent signed with patient.  ASA Score: 3     Day of Surgery Review of History & Physical: I have interviewed and examined the patient. I have reviewed the patient's H&P dated:  There are no significant changes.          Ready For Surgery From Anesthesia Perspective.

## 2022-01-10 NOTE — DISCHARGE INSTRUCTIONS
Your procedure  is scheduled for __1/19/2022________.    Call [phone] between 2pm and 5pm on _1/18/2022______to find out your arrival time for the day of surgery.    You may use the main entrance to the hospital on the [facility] side, or the entrance that is next to the garage.     You may have two visitors.  Visiting hours for non-COVID-19 patients expanded to 24/7 (still restricted to one visitor)   Youth visitation changed from age 18 to age 12.      You will be going to the Same Day Surgery Unit on the 2nd floor of the hospital.    Important instructions:   Do not eat or drink after 12 midnight, including water.  It is okay to brush your teeth.  Do not have gum, candy or mints.    SEE MEDICATION SHEET.   TAKE MEDICATIONS AS DIRECTED WITH SIPS OF WATER.    STOP taking Aspirin, Ibuprofen,  Advil, Motrin, Mobic(meloxicam), Aleve (naproxen), Fish oil, and Vitamin E for at least 7 days before your surgery.     You may take Tylenol if needed which is not a blood thinner.     Please shower the night before and the morning of your surgery.      Use Hibiclens soap as instructed by your pre op nurse.   Please place clean linens on your bed the night before surgery. Please wear fresh clean clothing after each shower.     No shaving of procedural area at least 4-5 days before surgery due to increased risk of skin irritation and/or possible infection.     Contact lenses and removable denture work may not be worn during your procedure.     You may wear deodorant only. If you are having breast surgery, do not wear deodorant on the operative side.     Do not wear powder, body lotion, perfume/cologne or make-up.     Do not wear any jewelry or have any metal on your body.     You will be asked to remove any dentures or partials for the procedure.     If you are going home on the same day of surgery, you must arrange for a family member or a friend to drive you home.  Public transportation is prohibited.  You  will not be able to drive home if you were given anesthesia or sedation.     Patients who want to have their Post-op prescriptions filled from our in-house [facility], bring a Credit/Debit Card   or cash with you. A co-pay may be required.  The pharmacy closes at 5:30 pm.     Wear loose fitting clothes allowing for bandages.     Please leave money and valuables home.       You may bring your cell phone.     Call the doctor if fever or illness should occur before your surgery.    Call [phone] to contact us here if needed.

## 2022-01-10 NOTE — H&P (VIEW-ONLY)
Cc:  Pre-op for TLH    HPI:  56 yro  presents for pre-op exam.  Pt has very heavy menses that have not been controlled with IUD.  Pt currently taking megace which has decreased but not fully stopped prolonged bleeding.  Pt now desires definitive treatment.    Sono:  The uterus is anteverted measuring 8.9 x 4.4 x 5.3 cm.  There is a 1.6 cm lesion seen at the fundus thought to represent fibroid.     The right ovary measures 3.2 x 2.0 x 2.2 cm with normal flow.     The left ovary is not visualized.     An IUD is seen within the uterus.:     An IUD is seen within the uterus.  There is a 1.6 cm uterine lesion thought to represent fibroid.     Impression:     No sonographic abnormality.    Past Medical History:   Diagnosis Date    Hypertension      History reviewed. No pertinent surgical history.    Review of patient's allergies indicates:  No Known Allergies    Patient's Medications   New Prescriptions    No medications on file   Previous Medications    ACETAMINOPHEN (TYLENOL) 650 MG TBSR    Take 1 tablet (650 mg total) by mouth every 8 (eight) hours as needed.    ALBUTEROL (PROVENTIL/VENTOLIN HFA) 90 MCG/ACTUATION INHALER    Inhale 1-2 puffs into the lungs every 6 (six) hours as needed for Wheezing. Rescue    ASPIRIN 325 MG TABLET    Take 325 mg by mouth once daily.    ASPIRIN 325 MG TABLET    Take 325 mg by mouth.    CETIRIZINE (ZYRTEC) 10 MG TABLET    Take 1 tablet (10 mg total) by mouth daily as needed (runny nose/congestion).    DIPHENHYDRAMINE (BENADRYL) 50 MG CAPSULE    Take 1 capsule (50 mg total) by mouth nightly as needed (rhinorrhea/congestion).    ERGOCALCIFEROL (ERGOCALCIFEROL) 50,000 UNIT CAP    Take 50,000 Units by mouth every 7 days.    ERGOCALCIFEROL (ERGOCALCIFEROL) 50,000 UNIT CAP    Take 50,000 Units by mouth.    ESTRADIOL (ESTRACE) 2 MG TABLET    Take 1 tablet (2 mg total) by mouth once daily.    FISH OIL-OMEGA-3 FATTY ACIDS 300-1,000 MG CAPSULE    Take 2 g by mouth once daily.     "LISINOPRIL-HYDROCHLOROTHIAZIDE (PRINZIDE,ZESTORETIC) 20-12.5 MG PER TABLET    Take 1 tablet by mouth once daily.    MEGESTROL (MEGACE) 40 MG TAB    Take 1 tablet (40 mg total) by mouth once daily.    MELOXICAM (MOBIC) 7.5 MG TABLET    Take 1 tablet (7.5 mg total) by mouth once daily. Take with food    MULTIVITAMIN (THERAGRAN) PER TABLET    Take 1 tablet by mouth.    OMEGA-3 FATTY ACIDS 1,000 MG CAP    Take 2 g by mouth.    ONDANSETRON (ZOFRAN-ODT) 4 MG TBDL    Take 1 tablet (4 mg total) by mouth every 8 (eight) hours as needed.    PHENTERMINE 37.5 MG CAPSULE    Take 37.5 mg by mouth every morning.    TAMSULOSIN (FLOMAX) 0.4 MG CP24    Take 1 capsule (0.4 mg total) by mouth daily as needed (urinary frequency or pain with urination).    UNKNOWN TO PATIENT       Modified Medications    No medications on file   Discontinued Medications    LISINOPRIL (PRINIVIL,ZESTRIL) 20 MG TABLET    Take 20 mg by mouth.    LISINOPRIL 10 MG TABLET    Take 10 mg by mouth once daily.     Social History     Tobacco Use    Smoking status: Former Smoker     Quit date: 1998     Years since quittin.0    Smokeless tobacco: Never Used   Substance Use Topics    Alcohol use: Yes     Comment: OCCASIONAL    Drug use: No     Family History   Problem Relation Age of Onset    Hypertension Mother     Cancer Mother     HIV Mother     Hypertension Father     Heart disease Father     Hypertension Maternal Grandmother     Alzheimer's disease Maternal Grandmother      Review of Systems   Constitutional: Negative.    HENT: Negative.    Respiratory: Negative.    Cardiovascular: Negative.    Gastrointestinal: Negative.    Genitourinary: Negative.    Musculoskeletal: Negative.    Neurological: Negative.    Psychiatric/Behavioral: Negative.      Vitals:    01/10/22 0907   BP: (!) 140/70   Weight: 108.9 kg (240 lb)   Height: 5' 3" (1.6 m)     Physical Exam  Vitals reviewed.   Constitutional:       General: She is not in acute distress.     " Appearance: Normal appearance. She is normal weight. She is not ill-appearing, toxic-appearing or diaphoretic.   HENT:      Head: Normocephalic and atraumatic.   Cardiovascular:      Rate and Rhythm: Normal rate.   Pulmonary:      Effort: Pulmonary effort is normal. No respiratory distress.   Abdominal:      General: Abdomen is flat. There is no distension.      Palpations: There is no mass.      Tenderness: There is no abdominal tenderness. There is no guarding.      Hernia: No hernia is present.   Musculoskeletal:         General: No swelling, tenderness, deformity or signs of injury. Normal range of motion.   Skin:     General: Skin is warm.      Coloration: Skin is not jaundiced or pale.   Neurological:      General: No focal deficit present.      Mental Status: She is alert. Mental status is at baseline.      Cranial Nerves: No cranial nerve deficit.      Sensory: No sensory deficit.      Coordination: Coordination normal.   Psychiatric:         Mood and Affect: Mood normal.         Behavior: Behavior normal.         Thought Content: Thought content normal.         Judgment: Judgment normal.     Assessment/Plan  1. Abnormal perimenopausal bleeding    2. Menorrhagia with irregular cycle    3. Breakthrough bleeding on depo provera    4. Pre-op exam      Pt consented for TLH, all questions answered and consents signed

## 2022-01-18 ENCOUNTER — HOSPITAL ENCOUNTER (OUTPATIENT)
Dept: PREADMISSION TESTING | Facility: HOSPITAL | Age: 57
Discharge: HOME | End: 2022-01-18
Attending: INTERNAL MEDICINE
Payer: MEDICAID

## 2022-01-18 DIAGNOSIS — N92.1 BREAKTHROUGH BLEEDING ON DEPO PROVERA: ICD-10-CM

## 2022-01-18 DIAGNOSIS — N92.1 MENORRHAGIA WITH IRREGULAR CYCLE: ICD-10-CM

## 2022-01-18 DIAGNOSIS — Z01.818 PREOPERATIVE TESTING: ICD-10-CM

## 2022-01-18 DIAGNOSIS — Z01.812 ENCOUNTER FOR PREOPERATIVE SCREENING LABORATORY TESTING FOR COVID-19 VIRUS: ICD-10-CM

## 2022-01-18 DIAGNOSIS — N92.4 ABNORMAL PERIMENOPAUSAL BLEEDING: ICD-10-CM

## 2022-01-18 DIAGNOSIS — Z11.52 ENCOUNTER FOR PREOPERATIVE SCREENING LABORATORY TESTING FOR COVID-19 VIRUS: ICD-10-CM

## 2022-01-18 DIAGNOSIS — Z01.818 PRE-OP EXAM: ICD-10-CM

## 2022-01-18 LAB
ABO + RH BLD: NORMAL
B-HCG UR QL: NEGATIVE
BLD GP AB SCN CELLS X3 SERPL QL: NORMAL
SARS-COV-2 RDRP RESP QL NAA+PROBE: NEGATIVE

## 2022-01-18 PROCEDURE — U0002 COVID-19 LAB TEST NON-CDC: HCPCS | Performed by: OBSTETRICS & GYNECOLOGY

## 2022-01-18 PROCEDURE — 86901 BLOOD TYPING SEROLOGIC RH(D): CPT | Performed by: OBSTETRICS & GYNECOLOGY

## 2022-01-18 PROCEDURE — 36415 COLL VENOUS BLD VENIPUNCTURE: CPT | Performed by: OBSTETRICS & GYNECOLOGY

## 2022-01-18 PROCEDURE — 81025 URINE PREGNANCY TEST: CPT | Performed by: OBSTETRICS & GYNECOLOGY

## 2022-01-18 PROCEDURE — 86900 BLOOD TYPING SEROLOGIC ABO: CPT | Performed by: OBSTETRICS & GYNECOLOGY

## 2022-01-19 ENCOUNTER — HOSPITAL ENCOUNTER (OUTPATIENT)
Facility: HOSPITAL | Age: 57
Discharge: HOME | End: 2022-01-19
Attending: OBSTETRICS & GYNECOLOGY | Admitting: OBSTETRICS & GYNECOLOGY
Payer: MEDICAID

## 2022-01-19 ENCOUNTER — ANESTHESIA (OUTPATIENT)
Dept: SURGERY | Facility: HOSPITAL | Age: 57
End: 2022-01-19
Payer: MEDICAID

## 2022-01-19 VITALS
RESPIRATION RATE: 18 BRPM | WEIGHT: 240 LBS | BODY MASS INDEX: 42.51 KG/M2 | SYSTOLIC BLOOD PRESSURE: 145 MMHG | TEMPERATURE: 99 F | DIASTOLIC BLOOD PRESSURE: 67 MMHG | OXYGEN SATURATION: 100 % | HEART RATE: 90 BPM

## 2022-01-19 DIAGNOSIS — Z90.710 S/P TOTAL HYSTERECTOMY: Primary | ICD-10-CM

## 2022-01-19 DIAGNOSIS — Z11.52 ENCOUNTER FOR PREOPERATIVE SCREENING LABORATORY TESTING FOR COVID-19 VIRUS: ICD-10-CM

## 2022-01-19 DIAGNOSIS — N92.1 MENORRHAGIA WITH IRREGULAR CYCLE: ICD-10-CM

## 2022-01-19 DIAGNOSIS — Z01.818 PRE-OP EXAM: ICD-10-CM

## 2022-01-19 DIAGNOSIS — Z01.812 ENCOUNTER FOR PREOPERATIVE SCREENING LABORATORY TESTING FOR COVID-19 VIRUS: ICD-10-CM

## 2022-01-19 DIAGNOSIS — N92.4 ABNORMAL PERIMENOPAUSAL BLEEDING: ICD-10-CM

## 2022-01-19 DIAGNOSIS — N92.1 BREAKTHROUGH BLEEDING ON DEPO PROVERA: ICD-10-CM

## 2022-01-19 DIAGNOSIS — Z01.818 PREOPERATIVE TESTING: ICD-10-CM

## 2022-01-19 LAB — POCT GLUCOSE: 99 MG/DL (ref 70–110)

## 2022-01-19 PROCEDURE — 25000003 PHARM REV CODE 250: Performed by: STUDENT IN AN ORGANIZED HEALTH CARE EDUCATION/TRAINING PROGRAM

## 2022-01-19 PROCEDURE — D9220A PRA ANESTHESIA: ICD-10-PCS | Mod: ANES,,, | Performed by: ANESTHESIOLOGY

## 2022-01-19 PROCEDURE — 88307 PR  SURG PATH,LEVEL V: ICD-10-PCS | Mod: 26,,, | Performed by: PATHOLOGY

## 2022-01-19 PROCEDURE — D9220A PRA ANESTHESIA: Mod: ANES,,, | Performed by: ANESTHESIOLOGY

## 2022-01-19 PROCEDURE — 58571 TLH W/T/O 250 G OR LESS: CPT | Mod: ,,, | Performed by: OBSTETRICS & GYNECOLOGY

## 2022-01-19 PROCEDURE — 71000033 HC RECOVERY, INTIAL HOUR: Performed by: OBSTETRICS & GYNECOLOGY

## 2022-01-19 PROCEDURE — 63600175 PHARM REV CODE 636 W HCPCS: Performed by: STUDENT IN AN ORGANIZED HEALTH CARE EDUCATION/TRAINING PROGRAM

## 2022-01-19 PROCEDURE — 58571 PR LAPAROSCOPY W TOT HYSTERECTUTERUS <=250 GRAM  W TUBE/OVARY: ICD-10-PCS | Mod: ,,, | Performed by: OBSTETRICS & GYNECOLOGY

## 2022-01-19 PROCEDURE — 25000003 PHARM REV CODE 250: Performed by: ANESTHESIOLOGY

## 2022-01-19 PROCEDURE — 71000016 HC POSTOP RECOV ADDL HR: Performed by: OBSTETRICS & GYNECOLOGY

## 2022-01-19 PROCEDURE — C2628 CATHETER, OCCLUSION: HCPCS | Performed by: OBSTETRICS & GYNECOLOGY

## 2022-01-19 PROCEDURE — 71000039 HC RECOVERY, EACH ADD'L HOUR: Performed by: OBSTETRICS & GYNECOLOGY

## 2022-01-19 PROCEDURE — 00840 ANES IPER PX LOWER ABD NOS: CPT | Performed by: OBSTETRICS & GYNECOLOGY

## 2022-01-19 PROCEDURE — 36000711: Performed by: OBSTETRICS & GYNECOLOGY

## 2022-01-19 PROCEDURE — D9220A PRA ANESTHESIA: Mod: CRNA,,, | Performed by: STUDENT IN AN ORGANIZED HEALTH CARE EDUCATION/TRAINING PROGRAM

## 2022-01-19 PROCEDURE — 58571 TLH W/T/O 250 G OR LESS: CPT | Mod: 80,,, | Performed by: OBSTETRICS & GYNECOLOGY

## 2022-01-19 PROCEDURE — 88307 TISSUE EXAM BY PATHOLOGIST: CPT | Performed by: PATHOLOGY

## 2022-01-19 PROCEDURE — 27201423 OPTIME MED/SURG SUP & DEVICES STERILE SUPPLY: Performed by: OBSTETRICS & GYNECOLOGY

## 2022-01-19 PROCEDURE — 25000003 PHARM REV CODE 250: Performed by: OBSTETRICS & GYNECOLOGY

## 2022-01-19 PROCEDURE — 37000009 HC ANESTHESIA EA ADD 15 MINS: Performed by: OBSTETRICS & GYNECOLOGY

## 2022-01-19 PROCEDURE — 88307 TISSUE EXAM BY PATHOLOGIST: CPT | Mod: 26,,, | Performed by: PATHOLOGY

## 2022-01-19 PROCEDURE — 63600175 PHARM REV CODE 636 W HCPCS: Performed by: ANESTHESIOLOGY

## 2022-01-19 PROCEDURE — 36000710: Performed by: OBSTETRICS & GYNECOLOGY

## 2022-01-19 PROCEDURE — 82962 GLUCOSE BLOOD TEST: CPT | Performed by: OBSTETRICS & GYNECOLOGY

## 2022-01-19 PROCEDURE — 71000015 HC POSTOP RECOV 1ST HR: Performed by: OBSTETRICS & GYNECOLOGY

## 2022-01-19 PROCEDURE — D9220A PRA ANESTHESIA: ICD-10-PCS | Mod: CRNA,,, | Performed by: STUDENT IN AN ORGANIZED HEALTH CARE EDUCATION/TRAINING PROGRAM

## 2022-01-19 PROCEDURE — 58571 PR LAPAROSCOPY W TOT HYSTERECTUTERUS <=250 GRAM  W TUBE/OVARY: ICD-10-PCS | Mod: 80,,, | Performed by: OBSTETRICS & GYNECOLOGY

## 2022-01-19 PROCEDURE — 37000008 HC ANESTHESIA 1ST 15 MINUTES: Performed by: OBSTETRICS & GYNECOLOGY

## 2022-01-19 RX ORDER — LIDOCAINE HYDROCHLORIDE 20 MG/ML
INJECTION INTRAVENOUS
Status: DISCONTINUED | OUTPATIENT
Start: 2022-01-19 | End: 2022-01-19

## 2022-01-19 RX ORDER — HYDROCODONE BITARTRATE AND ACETAMINOPHEN 5; 325 MG/1; MG/1
1 TABLET ORAL EVERY 4 HOURS PRN
Qty: 20 TABLET | Refills: 0 | Status: SHIPPED | OUTPATIENT
Start: 2022-01-19

## 2022-01-19 RX ORDER — MIDAZOLAM HYDROCHLORIDE 1 MG/ML
INJECTION, SOLUTION INTRAMUSCULAR; INTRAVENOUS
Status: DISCONTINUED | OUTPATIENT
Start: 2022-01-19 | End: 2022-01-19

## 2022-01-19 RX ORDER — ACETAMINOPHEN 500 MG
1000 TABLET ORAL
Status: COMPLETED | OUTPATIENT
Start: 2022-01-19 | End: 2022-01-19

## 2022-01-19 RX ORDER — ONDANSETRON 2 MG/ML
INJECTION INTRAMUSCULAR; INTRAVENOUS
Status: DISCONTINUED | OUTPATIENT
Start: 2022-01-19 | End: 2022-01-19

## 2022-01-19 RX ORDER — PROPOFOL 10 MG/ML
VIAL (ML) INTRAVENOUS
Status: DISCONTINUED | OUTPATIENT
Start: 2022-01-19 | End: 2022-01-19

## 2022-01-19 RX ORDER — MUPIROCIN 20 MG/G
OINTMENT TOPICAL
Status: DISCONTINUED | OUTPATIENT
Start: 2022-01-19 | End: 2022-01-19 | Stop reason: HOSPADM

## 2022-01-19 RX ORDER — KETOROLAC TROMETHAMINE 30 MG/ML
INJECTION, SOLUTION INTRAMUSCULAR; INTRAVENOUS
Status: DISCONTINUED | OUTPATIENT
Start: 2022-01-19 | End: 2022-01-19

## 2022-01-19 RX ORDER — HYDROCODONE BITARTRATE AND ACETAMINOPHEN 5; 325 MG/1; MG/1
1 TABLET ORAL EVERY 4 HOURS PRN
Status: DISCONTINUED | OUTPATIENT
Start: 2022-01-19 | End: 2022-01-19 | Stop reason: HOSPADM

## 2022-01-19 RX ORDER — IBUPROFEN 800 MG/1
800 TABLET ORAL 3 TIMES DAILY
Qty: 40 TABLET | Refills: 0 | OUTPATIENT
Start: 2022-01-19 | End: 2024-02-28

## 2022-01-19 RX ORDER — IBUPROFEN 400 MG/1
800 TABLET ORAL 3 TIMES DAILY
Status: DISCONTINUED | OUTPATIENT
Start: 2022-01-19 | End: 2022-01-19 | Stop reason: HOSPADM

## 2022-01-19 RX ORDER — SUCCINYLCHOLINE CHLORIDE 20 MG/ML
INJECTION INTRAMUSCULAR; INTRAVENOUS
Status: DISCONTINUED | OUTPATIENT
Start: 2022-01-19 | End: 2022-01-19

## 2022-01-19 RX ORDER — PROCHLORPERAZINE EDISYLATE 5 MG/ML
INJECTION INTRAMUSCULAR; INTRAVENOUS
Status: DISCONTINUED | OUTPATIENT
Start: 2022-01-19 | End: 2022-01-19

## 2022-01-19 RX ORDER — SODIUM CHLORIDE, SODIUM LACTATE, POTASSIUM CHLORIDE, CALCIUM CHLORIDE 600; 310; 30; 20 MG/100ML; MG/100ML; MG/100ML; MG/100ML
INJECTION, SOLUTION INTRAVENOUS CONTINUOUS
Status: DISCONTINUED | OUTPATIENT
Start: 2022-01-19 | End: 2022-01-19 | Stop reason: HOSPADM

## 2022-01-19 RX ORDER — SODIUM CHLORIDE 0.9 % (FLUSH) 0.9 %
10 SYRINGE (ML) INJECTION
Status: DISCONTINUED | OUTPATIENT
Start: 2022-01-19 | End: 2022-01-19 | Stop reason: HOSPADM

## 2022-01-19 RX ORDER — EPHEDRINE SULFATE 50 MG/ML
INJECTION, SOLUTION INTRAVENOUS
Status: DISCONTINUED | OUTPATIENT
Start: 2022-01-19 | End: 2022-01-19

## 2022-01-19 RX ORDER — DIPHENHYDRAMINE HYDROCHLORIDE 50 MG/ML
25 INJECTION INTRAMUSCULAR; INTRAVENOUS EVERY 4 HOURS PRN
Status: DISCONTINUED | OUTPATIENT
Start: 2022-01-19 | End: 2022-01-19 | Stop reason: HOSPADM

## 2022-01-19 RX ORDER — DEXAMETHASONE SODIUM PHOSPHATE 4 MG/ML
INJECTION, SOLUTION INTRA-ARTICULAR; INTRALESIONAL; INTRAMUSCULAR; INTRAVENOUS; SOFT TISSUE
Status: DISCONTINUED | OUTPATIENT
Start: 2022-01-19 | End: 2022-01-19

## 2022-01-19 RX ORDER — HYDROMORPHONE HYDROCHLORIDE 2 MG/ML
0.2 INJECTION, SOLUTION INTRAMUSCULAR; INTRAVENOUS; SUBCUTANEOUS EVERY 5 MIN PRN
Status: DISCONTINUED | OUTPATIENT
Start: 2022-01-19 | End: 2022-01-19 | Stop reason: HOSPADM

## 2022-01-19 RX ORDER — ROCURONIUM BROMIDE 10 MG/ML
INJECTION, SOLUTION INTRAVENOUS
Status: DISCONTINUED | OUTPATIENT
Start: 2022-01-19 | End: 2022-01-19

## 2022-01-19 RX ORDER — HYDROCODONE BITARTRATE AND ACETAMINOPHEN 10; 325 MG/1; MG/1
1 TABLET ORAL EVERY 4 HOURS PRN
Status: DISCONTINUED | OUTPATIENT
Start: 2022-01-19 | End: 2022-01-19 | Stop reason: HOSPADM

## 2022-01-19 RX ORDER — PHENYLEPHRINE HYDROCHLORIDE 10 MG/ML
INJECTION INTRAVENOUS
Status: DISCONTINUED | OUTPATIENT
Start: 2022-01-19 | End: 2022-01-19

## 2022-01-19 RX ORDER — ONDANSETRON 4 MG/1
8 TABLET, ORALLY DISINTEGRATING ORAL EVERY 8 HOURS PRN
Status: DISCONTINUED | OUTPATIENT
Start: 2022-01-19 | End: 2022-01-19 | Stop reason: HOSPADM

## 2022-01-19 RX ORDER — CEFAZOLIN SODIUM 2 G/50ML
2 SOLUTION INTRAVENOUS
Status: DISCONTINUED | OUTPATIENT
Start: 2022-01-19 | End: 2022-01-19 | Stop reason: HOSPADM

## 2022-01-19 RX ORDER — LORAZEPAM 2 MG/ML
0.25 INJECTION INTRAMUSCULAR EVERY 10 MIN PRN
Status: DISCONTINUED | OUTPATIENT
Start: 2022-01-19 | End: 2022-01-19 | Stop reason: HOSPADM

## 2022-01-19 RX ORDER — FENTANYL CITRATE 50 UG/ML
INJECTION, SOLUTION INTRAMUSCULAR; INTRAVENOUS
Status: DISCONTINUED | OUTPATIENT
Start: 2022-01-19 | End: 2022-01-19

## 2022-01-19 RX ORDER — LORAZEPAM 2 MG/ML
0.25 INJECTION INTRAMUSCULAR ONCE
Status: COMPLETED | OUTPATIENT
Start: 2022-01-19 | End: 2022-01-19

## 2022-01-19 RX ORDER — PROCHLORPERAZINE EDISYLATE 5 MG/ML
5 INJECTION INTRAMUSCULAR; INTRAVENOUS EVERY 6 HOURS PRN
Status: DISCONTINUED | OUTPATIENT
Start: 2022-01-19 | End: 2022-01-19 | Stop reason: HOSPADM

## 2022-01-19 RX ORDER — DIPHENHYDRAMINE HCL 25 MG
25 CAPSULE ORAL EVERY 4 HOURS PRN
Status: DISCONTINUED | OUTPATIENT
Start: 2022-01-19 | End: 2022-01-19 | Stop reason: HOSPADM

## 2022-01-19 RX ADMIN — PROCHLORPERAZINE EDISYLATE 5 MG: 5 INJECTION INTRAMUSCULAR; INTRAVENOUS at 08:01

## 2022-01-19 RX ADMIN — SODIUM CHLORIDE, SODIUM LACTATE, POTASSIUM CHLORIDE, AND CALCIUM CHLORIDE: .6; .31; .03; .02 INJECTION, SOLUTION INTRAVENOUS at 08:01

## 2022-01-19 RX ADMIN — MIDAZOLAM HYDROCHLORIDE 2 MG: 1 INJECTION, SOLUTION INTRAMUSCULAR; INTRAVENOUS at 07:01

## 2022-01-19 RX ADMIN — DEXAMETHASONE SODIUM PHOSPHATE 4 MG: 4 INJECTION, SOLUTION INTRAMUSCULAR; INTRAVENOUS at 07:01

## 2022-01-19 RX ADMIN — FENTANYL CITRATE 100 MCG: 50 INJECTION, SOLUTION INTRAMUSCULAR; INTRAVENOUS at 07:01

## 2022-01-19 RX ADMIN — PHENYLEPHRINE HYDROCHLORIDE 200 MCG: 10 INJECTION INTRAVENOUS at 07:01

## 2022-01-19 RX ADMIN — ROCURONIUM BROMIDE 30 MG: 10 INJECTION, SOLUTION INTRAVENOUS at 07:01

## 2022-01-19 RX ADMIN — LORAZEPAM 0.25 MG: 2 INJECTION INTRAMUSCULAR; INTRAVENOUS at 09:01

## 2022-01-19 RX ADMIN — PROPOFOL 200 MG: 10 INJECTION, EMULSION INTRAVENOUS at 07:01

## 2022-01-19 RX ADMIN — EPHEDRINE SULFATE 10 MG: 50 INJECTION INTRAVENOUS at 08:01

## 2022-01-19 RX ADMIN — SUGAMMADEX 200 MG: 100 INJECTION, SOLUTION INTRAVENOUS at 08:01

## 2022-01-19 RX ADMIN — ROCURONIUM BROMIDE 20 MG: 10 INJECTION, SOLUTION INTRAVENOUS at 08:01

## 2022-01-19 RX ADMIN — SODIUM CHLORIDE, SODIUM LACTATE, POTASSIUM CHLORIDE, AND CALCIUM CHLORIDE: .6; .31; .03; .02 INJECTION, SOLUTION INTRAVENOUS at 07:01

## 2022-01-19 RX ADMIN — EPHEDRINE SULFATE 10 MG: 50 INJECTION INTRAVENOUS at 07:01

## 2022-01-19 RX ADMIN — KETOROLAC TROMETHAMINE 30 MG: 30 INJECTION, SOLUTION INTRAMUSCULAR; INTRAVENOUS at 08:01

## 2022-01-19 RX ADMIN — PHENYLEPHRINE HYDROCHLORIDE 100 MCG: 10 INJECTION INTRAVENOUS at 08:01

## 2022-01-19 RX ADMIN — PHENYLEPHRINE HYDROCHLORIDE 100 MCG: 10 INJECTION INTRAVENOUS at 07:01

## 2022-01-19 RX ADMIN — EPHEDRINE SULFATE 5 MG: 50 INJECTION INTRAVENOUS at 07:01

## 2022-01-19 RX ADMIN — LIDOCAINE HYDROCHLORIDE 100 MG: 20 INJECTION, SOLUTION INTRAVENOUS at 07:01

## 2022-01-19 RX ADMIN — EPHEDRINE SULFATE 5 MG: 50 INJECTION INTRAVENOUS at 08:01

## 2022-01-19 RX ADMIN — MUPIROCIN: 20 OINTMENT TOPICAL at 06:01

## 2022-01-19 RX ADMIN — CEFAZOLIN SODIUM 2 G: 1 POWDER, FOR SOLUTION INTRAMUSCULAR; INTRAVENOUS at 07:01

## 2022-01-19 RX ADMIN — ONDANSETRON 4 MG: 2 INJECTION, SOLUTION INTRAMUSCULAR; INTRAVENOUS at 08:01

## 2022-01-19 RX ADMIN — ACETAMINOPHEN 1000 MG: 500 TABLET ORAL at 06:01

## 2022-01-19 RX ADMIN — SUCCINYLCHOLINE CHLORIDE 100 MG: 20 INJECTION, SOLUTION INTRAMUSCULAR; INTRAVENOUS at 07:01

## 2022-01-19 RX ADMIN — PROPOFOL 20 MG: 10 INJECTION, EMULSION INTRAVENOUS at 08:01

## 2022-01-19 NOTE — BRIEF OP NOTE
[facility] - Surgery  Brief Operative Note    Surgery Date: 1/19/2022     Surgeon(s) and Role:     * Mike Manzo MD - Primary     * Dany Rodriguez MD - Assisting        Pre-op Diagnosis:  Abnormal perimenopausal bleeding [N92.4]  Menorrhagia with irregular cycle [N92.1]    Post-op Diagnosis:  Post-Op Diagnosis Codes:     * Abnormal perimenopausal bleeding [N92.4]     * Menorrhagia with irregular cycle [N92.1]    Procedure(s) (LRB):  HYSTERECTOMY, TOTAL, LAPAROSCOPIC (N/A) w/ bilateral salpingectomy    Anesthesia: General    Operative Findings: normal appearing female pelvis    Estimated Blood Loss: 50 mL         Specimens:   Uterus, cervix, bilateral fallopian tubes      Discharge Note    OUTCOME: Patient tolerated treatment/procedure well without complication and is now ready for discharge.    DISPOSITION: Home or Self Care    FINAL DIAGNOSIS:  S/P total hysterectomy    FOLLOWUP: In clinic    DISCHARGE INSTRUCTIONS:    Discharge Procedure Orders   Diet general     Pelvic Rest     Lifting restrictions     Call MD for:  temperature >100.4     Call MD for:  persistent nausea and vomiting     Call MD for:  severe uncontrolled pain     Call MD for:  difficulty breathing, headache or visual disturbances     Call MD for:  redness, tenderness, or signs of infection (pain, swelling, redness, odor or green/yellow discharge around incision site)     Call MD for:  persistent dizziness or light-headedness     Call MD for:  extreme fatigue     No dressing needed     Weight bearing restrictions (specify)

## 2022-01-19 NOTE — OR NURSING
"Patient states that her breathing is "better", VSS, O2 98% on room air.  Dressings x3 to abdomen dry and intact.  Continues to deny pain at present.  "

## 2022-01-19 NOTE — DISCHARGE INSTRUCTIONS
BATHING:                   You may shower after your dressing is removed, but no tub baths, hot tubs, saunas or swimming until you see the doctor.    DRESSING:  ? Remove your bandage in 24 hours. If there are skin tapes over the incision, leave them in place. They will start to come off in 5-7 days.  ACTIVITY LEVEL: If you have received sedation or an anesthetic, you may feel sleepy for   several hours. Rest until you are more awake. Gradually resume your normal activities  ? No heavy lifting or straining, nothing over 10 lbs., like a gallon of milk.  ? Pelvic rest- no sex, tampons or douching until follow up or instructed by doctor.  DIET:  You may resume your home diet. If nausea is present, increase your diet gradually with fluids and bland foods.    Medications:  Pain medication should be taken only if needed and as directed. If antibiotics are prescribed, the medication should be taken until completed. You will be given an updated list of you medications.  ? No driving, alcoholic beverages or signing legal documents for next 24 hours or while taking pain medication    CALL THE DOCTOR:    For any obvious bleeding (some dried blood over the incision is normal).      Redness, swelling, foul smell around incision or fever over 101.   Shortness of breath, Coughing up Bloody Sputum or Pains or Swelling in your calves.   Persistent pain or nausea not relieved by medication.   If vaginal bleeding is in excess of a normal period.   Problems urinating    If any unusual problems or difficulties occur contact your doctor. If you cannot contact your doctor but feel your signs and symptoms warrant a physicians attention return to the emergency room.        Fall Prevention  Millions of people fall every year and injure themselves. You may have had anesthesia or sedation which may increase your risk of falling. You may have health issues that put you at an increased risk of falling.     Here are ways to reduce your  risk of falling.  ·   · Make your home safe by keeping walkways clear of objects you may trip over.  · Use non-slip pads under rugs. Do not use area rugs or small throw rugs.  · Use non-slip mats in bathtubs and showers.  · Install handrails and lights on staircases.  · Do not walk in poorly lit areas.  · Do not stand on chairs or wobbly ladders.  · Use caution when reaching overhead or looking upward. This position can cause a loss of balance.  · Be sure your shoes fit properly, have non-slip bottoms and are in good condition.   · Wear shoes both inside and out. Avoid going barefoot or wearing slippers.  · Be cautious when going up and down stairs, curbs, and when walking on uneven sidewalks.  · If your balance is poor, consider using a cane or walker.  · If your fall was related to alcohol use, stop or limit alcohol intake.   · If your fall was related to use of sleeping medicines, talk to your doctor about this. You may need to reduce your dosage at bedtime if you awaken during the night to go to the bathroom.    · To reduce the need for nighttime bathroom trips:  ¨ Avoid drinking fluids for several hours before going to bed  ¨ Empty your bladder before going to bed  ¨ Men can keep a urinal at the bedside  · Stay as active as you can. Balance, flexibility, strength, and endurance all come from exercise. They all play a role in preventing falls. Ask your healthcare provider which types of activity are right for you.  · Get your vision checked on a regular basis.  · If you have pets, know where they are before you stand up or walk so you don't trip over them.  Use night lights.

## 2022-01-19 NOTE — TRANSFER OF CARE
Anesthesia Transfer of Care Note    Patient: Lupe Paredes    Procedure(s) Performed: Procedure(s) (LRB):  HYSTERECTOMY, TOTAL, LAPAROSCOPIC (N/A)    Patient location: PACU    Anesthesia Type: general    Transport from OR: Transported from OR on 6-10 L/min O2 by face mask with adequate spontaneous ventilation    Post pain: adequate analgesia    Post assessment: no apparent anesthetic complications and tolerated procedure well    Post vital signs: stable    Level of consciousness: awake    Nausea/Vomiting: no nausea/vomiting    Complications: none    Transfer of care protocol was followed      Last vitals:   Visit Vitals  BP (!) 141/82 (BP Location: Right arm, Patient Position: Sitting)   Pulse 104   Temp 36.1 °C (96.9 °F) (Oral)   Resp 20   Wt 108.9 kg (240 lb)   LMP 11/25/2021   SpO2 100%   Breastfeeding No   BMI 42.51 kg/m²

## 2022-01-19 NOTE — OP NOTE
Surgery Date: 1/19/2022      Surgeon(s) and Role:     * Mike Manzo MD - Primary     * Dany Rodriguez MD - Assisting           Pre-op Diagnosis:  Abnormal perimenopausal bleeding [N92.4]  Menorrhagia with irregular cycle [N92.1]     Post-op Diagnosis:  Post-Op Diagnosis Codes:     * Abnormal perimenopausal bleeding [N92.4]     * Menorrhagia with irregular cycle [N92.1]     Procedure(s) (LRB):  HYSTERECTOMY, TOTAL, LAPAROSCOPIC (N/A) w/ bilateral salpingectomy     Anesthesia: General     Operative Findings: normal appearing female pelvis     Estimated Blood Loss: 50 mL         Specimens:   Uterus, cervix, bilateral fallopian tubes    Complications:  None noted    Date of surgery:    Preoperative diagnosis:     Postoperative diagnosis:      Surgeon:  Mike Manzo MD    Assistant surgeon:  Dany Rodriguez MD    Surgery: Total Laparoscopic hysterectomy of uterus less than 250 mg with bilateral salpingectomy    Anesthesia:  General endotracheal    Pathology:  Uterus cervix bilateral fallopian tubes    Complications: none noted    EBL:  Approximately ____ mL    Procedure in detail  After informed consent was obtained, the patient was taken to the operating room and administered general endotracheal anesthesia without difficulty.  Patient was prepped and draped in normal sterile fashion in the dorsal lithotomy position.  Shaffer catheter was placed.  Heavy weighted speculum was then placed into the posterior fornix of the vagina, in the anterior lip of the cervix was grasped with a single-tooth tenaculum.  The uterus was sounded to _8___ cm.  Stay sutures of 0 Vicryl were placed at 12 o'clock. and 6:00 o'clock..  The cervix was progressively dilated using Ty Tucker dilators to accommodate the KOH colpotomizer.  Attention was then turned to the abdomen in which infraumbilical skin incision was made with a scalpel approximately 10 mm in diameter.  The Veress needle was inserted into the same incision, and the abdomen  insufflated, 1st with low-flow, then with high-flow CO2 gas.  Upon adequate insufflation the Veress needle was removed and a 10 mm optical trocar placed through the same incision using a laparoscopic for assistance.  There were no significant adhesions noted.  A 5 mm incision was made near the left anterior superior iliac crest and a 5 mm trocar placed under direct visualization.  This was repeated on the right side.  The fallopian tube, round ligament, in utero-ovarian ligament on the left side were clamped cauterized and transected with the EnSeal device.  The cardinal ligament and branches of the uterine artery were clamped cauterized and transected in a stepwise fashion using the EnSeal as well.  The bladder was dissected off the lower uterine segment and cervix.  The fallopian tube utero-ovarian ligament and round ligament on the right side were clamped cauterized and transected with the EnSeal in a similar fashion to the left, as were the uterine artery and cardinal ligament.  Cauterization to place all the way to the level uterosacral ligaments on both sides.  The uterus and cervix were amputated from the vagina using the Harmonic scalpel.   Attention was then turned back to the abdomen and the vaginal cuff was sewn laparoscopically using the Endo Stitch with a 2-0 V lock Vicryl suture in a running fashion.  Each fallopian tube was then dissected off the respective ovaries using the EnSeal.  The fallopian tubes were removed laparoscopically through the 10 mm trocar.  The pelvis was copiously irrigated with saline.  Excellent hemostasis was observed.  The 5 mm trocar sites were closed with a single simple interrupted 4-0 Rapide Vicryl suture.  The 10 mm trocar site was closed with 3 simple interrupted 4-0 Rapide Vicryl sutures. Sponge, lap, needle count, were correct x2.  There were no noted intraoperative complications.  The patient was taken recovery awake in stable condition.

## 2022-01-19 NOTE — OR NURSING
Patient reports that she is unable to catch her breath, VSS, O@ 100% on room air.  Does not appear to be in any distress.  Will notify Dr. Zaidi.

## 2022-01-19 NOTE — ANESTHESIA POSTPROCEDURE EVALUATION
Anesthesia Post Evaluation    Patient: Lupe Paredes    Procedure(s) Performed: Procedure(s) (LRB):  HYSTERECTOMY, TOTAL, LAPAROSCOPIC (N/A)    Final Anesthesia Type: general      Patient location during evaluation: PACU  Patient participation: Yes- Able to Participate  Level of consciousness: awake and alert and oriented  Post-procedure vital signs: reviewed and stable  Pain management: adequate  Airway patency: patent    PONV status at discharge: No PONV  Anesthetic complications: no      Cardiovascular status: blood pressure returned to baseline, hemodynamically stable and stable  Respiratory status: unassisted, spontaneous ventilation and room air  Hydration status: euvolemic  Follow-up not needed.          Vitals Value Taken Time   /67 01/19/22 1239   Temp 36.9 °C (98.5 °F) 01/19/22 1239   Pulse 90 01/19/22 1239   Resp 18 01/19/22 1239   SpO2 100 % 01/19/22 1239         Event Time   Out of Recovery 10:46:00         Pain/Lisseth Score: Pain Rating Prior to Med Admin: 0 (1/19/2022  6:40 AM)  Lisseth Score: 10 (1/19/2022 12:49 PM)  Modified Lisseth Score: 20 (1/19/2022 12:49 PM)

## 2022-01-21 LAB
FINAL PATHOLOGIC DIAGNOSIS: NORMAL
GROSS: NORMAL
Lab: NORMAL

## 2022-02-07 ENCOUNTER — OFFICE VISIT (OUTPATIENT)
Dept: OBSTETRICS AND GYNECOLOGY | Facility: CLINIC | Age: 57
End: 2022-02-07
Payer: MEDICAID

## 2022-02-07 ENCOUNTER — TELEPHONE (OUTPATIENT)
Dept: OBSTETRICS AND GYNECOLOGY | Facility: CLINIC | Age: 57
End: 2022-02-07

## 2022-02-07 VITALS
SYSTOLIC BLOOD PRESSURE: 138 MMHG | BODY MASS INDEX: 36.24 KG/M2 | WEIGHT: 204.56 LBS | DIASTOLIC BLOOD PRESSURE: 82 MMHG

## 2022-02-07 DIAGNOSIS — Z90.710 S/P TOTAL HYSTERECTOMY: Primary | ICD-10-CM

## 2022-02-07 DIAGNOSIS — Z09 POSTOPERATIVE EXAMINATION: ICD-10-CM

## 2022-02-07 PROCEDURE — 3079F PR MOST RECENT DIASTOLIC BLOOD PRESSURE 80-89 MM HG: ICD-10-PCS | Mod: CPTII,,, | Performed by: OBSTETRICS & GYNECOLOGY

## 2022-02-07 PROCEDURE — 99024 PR POST-OP FOLLOW-UP VISIT: ICD-10-PCS | Mod: ,,, | Performed by: OBSTETRICS & GYNECOLOGY

## 2022-02-07 PROCEDURE — 99999 PR PBB SHADOW E&M-EST. PATIENT-LVL II: ICD-10-PCS | Mod: PBBFAC,,, | Performed by: OBSTETRICS & GYNECOLOGY

## 2022-02-07 PROCEDURE — 3044F HG A1C LEVEL LT 7.0%: CPT | Mod: CPTII,,, | Performed by: OBSTETRICS & GYNECOLOGY

## 2022-02-07 PROCEDURE — 3079F DIAST BP 80-89 MM HG: CPT | Mod: CPTII,,, | Performed by: OBSTETRICS & GYNECOLOGY

## 2022-02-07 PROCEDURE — 99024 POSTOP FOLLOW-UP VISIT: CPT | Mod: ,,, | Performed by: OBSTETRICS & GYNECOLOGY

## 2022-02-07 PROCEDURE — 1159F MED LIST DOCD IN RCRD: CPT | Mod: CPTII,,, | Performed by: OBSTETRICS & GYNECOLOGY

## 2022-02-07 PROCEDURE — 99212 OFFICE O/P EST SF 10 MIN: CPT | Mod: PBBFAC | Performed by: OBSTETRICS & GYNECOLOGY

## 2022-02-07 PROCEDURE — 3075F SYST BP GE 130 - 139MM HG: CPT | Mod: CPTII,,, | Performed by: OBSTETRICS & GYNECOLOGY

## 2022-02-07 PROCEDURE — 3008F BODY MASS INDEX DOCD: CPT | Mod: CPTII,,, | Performed by: OBSTETRICS & GYNECOLOGY

## 2022-02-07 PROCEDURE — 99999 PR PBB SHADOW E&M-EST. PATIENT-LVL II: CPT | Mod: PBBFAC,,, | Performed by: OBSTETRICS & GYNECOLOGY

## 2022-02-07 PROCEDURE — 3075F PR MOST RECENT SYSTOLIC BLOOD PRESS GE 130-139MM HG: ICD-10-PCS | Mod: CPTII,,, | Performed by: OBSTETRICS & GYNECOLOGY

## 2022-02-07 PROCEDURE — 1159F PR MEDICATION LIST DOCUMENTED IN MEDICAL RECORD: ICD-10-PCS | Mod: CPTII,,, | Performed by: OBSTETRICS & GYNECOLOGY

## 2022-02-07 PROCEDURE — 3008F PR BODY MASS INDEX (BMI) DOCUMENTED: ICD-10-PCS | Mod: CPTII,,, | Performed by: OBSTETRICS & GYNECOLOGY

## 2022-02-07 PROCEDURE — 3044F PR MOST RECENT HEMOGLOBIN A1C LEVEL <7.0%: ICD-10-PCS | Mod: CPTII,,, | Performed by: OBSTETRICS & GYNECOLOGY

## 2022-02-07 NOTE — TELEPHONE ENCOUNTER
Patient is aware that she can tub soak               ----- Message from Amena Valiente sent at 2/7/2022  2:55 PM CST -----  Contact: IZZY DARNELL [[id number]]  Type: Call Back    Who called: IZZY DARNELL [[id number]]    What is the request in detail: Patient is requesting a call back. She states that she forgot to ask during her post op is it ok for her to sit in the tub. Please advise.     Can the clinic reply by [facility]? No    Would the patient rather a call back or a response via [facility]? Call back     Best call back number: [phone] (mobile)    Additional Information:

## 2022-02-07 NOTE — PROGRESS NOTES
Cc:  Postop check    HPI:  Pt had uncomplicated TLH approx 3 wks ago for menorrhagia.  Pathology benign.  Pt denies any significant pain, denies any bowel or bladder probs.    Vitals:    02/07/22 0951   BP: 138/82   BP Location: Right arm   Patient Position: Sitting   BP Method: Medium (Manual)   Weight: 92.8 kg (204 lb 9.4 oz)     Abd:  Soft NTND, trocar sites healing well, no suture present.    Assessment/Plan    1. S/P total hysterectomy     2. Postoperative examination       Fu 3 wks for expected full release from surgery, stressed need to continue limitation for now

## 2022-02-22 ENCOUNTER — NURSE TRIAGE (OUTPATIENT)
Dept: ADMINISTRATIVE | Facility: CLINIC | Age: 57
End: 2022-02-22
Payer: MEDICAID

## 2022-02-22 NOTE — TELEPHONE ENCOUNTER
Pt reports she is needing to get in touch Dr. Manzo office about her up coming appointment, Pt declined to be triaged now, call placed to the office but it was already closed, pt advised to call in the morning when office is open per protocol, Pt encouraged to call back with any worsening symptoms or questions and verbalized understanding.    Reason for Disposition   [1] Caller requesting NON-URGENT health information AND [2] PCP's office is the best resource    Protocols used: INFORMATION ONLY CALL - NO TRIAGE-A-

## 2022-02-28 ENCOUNTER — OFFICE VISIT (OUTPATIENT)
Dept: OBSTETRICS AND GYNECOLOGY | Facility: CLINIC | Age: 57
End: 2022-02-28
Payer: MEDICAID

## 2022-02-28 VITALS
SYSTOLIC BLOOD PRESSURE: 146 MMHG | BODY MASS INDEX: 35.88 KG/M2 | WEIGHT: 202.5 LBS | HEIGHT: 63 IN | DIASTOLIC BLOOD PRESSURE: 90 MMHG

## 2022-02-28 DIAGNOSIS — Z09 POSTOPERATIVE EXAMINATION: ICD-10-CM

## 2022-02-28 DIAGNOSIS — Z90.710 S/P TOTAL HYSTERECTOMY: Primary | ICD-10-CM

## 2022-02-28 PROCEDURE — 99212 OFFICE O/P EST SF 10 MIN: CPT | Mod: PBBFAC | Performed by: OBSTETRICS & GYNECOLOGY

## 2022-02-28 PROCEDURE — 99999 PR PBB SHADOW E&M-EST. PATIENT-LVL II: ICD-10-PCS | Mod: PBBFAC,,, | Performed by: OBSTETRICS & GYNECOLOGY

## 2022-02-28 PROCEDURE — 3080F DIAST BP >= 90 MM HG: CPT | Mod: CPTII,,, | Performed by: OBSTETRICS & GYNECOLOGY

## 2022-02-28 PROCEDURE — 3077F SYST BP >= 140 MM HG: CPT | Mod: CPTII,,, | Performed by: OBSTETRICS & GYNECOLOGY

## 2022-02-28 PROCEDURE — 99024 PR POST-OP FOLLOW-UP VISIT: ICD-10-PCS | Mod: ,,, | Performed by: OBSTETRICS & GYNECOLOGY

## 2022-02-28 PROCEDURE — 99024 POSTOP FOLLOW-UP VISIT: CPT | Mod: ,,, | Performed by: OBSTETRICS & GYNECOLOGY

## 2022-02-28 PROCEDURE — 3008F PR BODY MASS INDEX (BMI) DOCUMENTED: ICD-10-PCS | Mod: CPTII,,, | Performed by: OBSTETRICS & GYNECOLOGY

## 2022-02-28 PROCEDURE — 3077F PR MOST RECENT SYSTOLIC BLOOD PRESSURE >= 140 MM HG: ICD-10-PCS | Mod: CPTII,,, | Performed by: OBSTETRICS & GYNECOLOGY

## 2022-02-28 PROCEDURE — 99999 PR PBB SHADOW E&M-EST. PATIENT-LVL II: CPT | Mod: PBBFAC,,, | Performed by: OBSTETRICS & GYNECOLOGY

## 2022-02-28 PROCEDURE — 3080F PR MOST RECENT DIASTOLIC BLOOD PRESSURE >= 90 MM HG: ICD-10-PCS | Mod: CPTII,,, | Performed by: OBSTETRICS & GYNECOLOGY

## 2022-02-28 PROCEDURE — 3008F BODY MASS INDEX DOCD: CPT | Mod: CPTII,,, | Performed by: OBSTETRICS & GYNECOLOGY

## 2022-02-28 PROCEDURE — 3044F PR MOST RECENT HEMOGLOBIN A1C LEVEL <7.0%: ICD-10-PCS | Mod: CPTII,,, | Performed by: OBSTETRICS & GYNECOLOGY

## 2022-02-28 PROCEDURE — 3044F HG A1C LEVEL LT 7.0%: CPT | Mod: CPTII,,, | Performed by: OBSTETRICS & GYNECOLOGY

## 2022-02-28 NOTE — PROGRESS NOTES
"Cc:  Postop check    HPI:  Pt had TLH w/ bilateral salpingectomy on 1/19/22 for perimenopausal menorrhagia.  Pathology was benign.  Pt has returned to work as a CNA.  Pt has not been sexually active yet, but does desire to be.  Pt denies any bowel or bladder probs.    Vitals:    02/28/22 1308   BP: (!) 146/90   Weight: 91.8 kg (202 lb 7.9 oz)   Height: 5' 3" (1.6 m)     Abd:  Soft NTND  Pelvic:  Vaginal mucosa pink, normal physiologic d/c, cuff intact, well healed; no pain with bimanual exam.    Assessment/Plan  1. S/P total hysterectomy    2. Postoperative examination      F/u 1 yr or prn; pt has no restrictions    "

## 2023-02-23 ENCOUNTER — HOSPITAL ENCOUNTER (EMERGENCY)
Facility: HOSPITAL | Age: 58
Discharge: HOME | End: 2023-02-23
Attending: EMERGENCY MEDICINE
Payer: MEDICAID

## 2023-02-23 VITALS
HEIGHT: 63 IN | SYSTOLIC BLOOD PRESSURE: 126 MMHG | OXYGEN SATURATION: 100 % | RESPIRATION RATE: 18 BRPM | TEMPERATURE: 99 F | DIASTOLIC BLOOD PRESSURE: 80 MMHG | HEART RATE: 86 BPM | WEIGHT: 198 LBS | BODY MASS INDEX: 35.08 KG/M2

## 2023-02-23 DIAGNOSIS — M25.512 LEFT SHOULDER PAIN: ICD-10-CM

## 2023-02-23 DIAGNOSIS — M19.012 OSTEOARTHRITIS OF LEFT SHOULDER, UNSPECIFIED OSTEOARTHRITIS TYPE: Primary | ICD-10-CM

## 2023-02-23 DIAGNOSIS — M72.2 PLANTAR FASCIITIS: ICD-10-CM

## 2023-02-23 LAB — POCT GLUCOSE: 102 MG/DL (ref 70–110)

## 2023-02-23 PROCEDURE — 96372 THER/PROPH/DIAG INJ SC/IM: CPT | Performed by: PHYSICIAN ASSISTANT

## 2023-02-23 PROCEDURE — 99284 EMERGENCY DEPT VISIT MOD MDM: CPT

## 2023-02-23 PROCEDURE — 25000003 PHARM REV CODE 250: Performed by: PHYSICIAN ASSISTANT

## 2023-02-23 PROCEDURE — 93005 ELECTROCARDIOGRAM TRACING: CPT

## 2023-02-23 PROCEDURE — 82962 GLUCOSE BLOOD TEST: CPT

## 2023-02-23 PROCEDURE — 93010 EKG 12-LEAD: ICD-10-PCS | Mod: ,,, | Performed by: INTERNAL MEDICINE

## 2023-02-23 PROCEDURE — 63600175 PHARM REV CODE 636 W HCPCS: Performed by: PHYSICIAN ASSISTANT

## 2023-02-23 PROCEDURE — 93010 ELECTROCARDIOGRAM REPORT: CPT | Mod: ,,, | Performed by: INTERNAL MEDICINE

## 2023-02-23 RX ORDER — PREDNISONE 20 MG/1
60 TABLET ORAL
Status: COMPLETED | OUTPATIENT
Start: 2023-02-23 | End: 2023-02-23

## 2023-02-23 RX ORDER — ORPHENADRINE CITRATE 100 MG/1
100 TABLET, EXTENDED RELEASE ORAL ONCE
Status: COMPLETED | OUTPATIENT
Start: 2023-02-23 | End: 2023-02-23

## 2023-02-23 RX ORDER — PREDNISONE 20 MG/1
60 TABLET ORAL DAILY
Qty: 9 TABLET | Refills: 0 | Status: SHIPPED | OUTPATIENT
Start: 2023-02-23 | End: 2023-02-26

## 2023-02-23 RX ORDER — MELOXICAM 7.5 MG/1
7.5 TABLET ORAL DAILY
Qty: 12 TABLET | Refills: 0 | Status: SHIPPED | OUTPATIENT
Start: 2023-02-23

## 2023-02-23 RX ORDER — KETOROLAC TROMETHAMINE 30 MG/ML
30 INJECTION, SOLUTION INTRAMUSCULAR; INTRAVENOUS
Status: COMPLETED | OUTPATIENT
Start: 2023-02-23 | End: 2023-02-23

## 2023-02-23 RX ORDER — CYCLOBENZAPRINE HCL 10 MG
10 TABLET ORAL 3 TIMES DAILY PRN
Qty: 12 TABLET | Refills: 0 | Status: SHIPPED | OUTPATIENT
Start: 2023-02-23 | End: 2023-03-05

## 2023-02-23 RX ADMIN — PREDNISONE 60 MG: 20 TABLET ORAL at 05:02

## 2023-02-23 RX ADMIN — KETOROLAC TROMETHAMINE 30 MG: 30 INJECTION, SOLUTION INTRAMUSCULAR; INTRAVENOUS at 05:02

## 2023-02-23 RX ADMIN — ORPHENADRINE CITRATE 100 MG: 100 TABLET, EXTENDED RELEASE ORAL at 05:02

## 2023-02-23 NOTE — DISCHARGE INSTRUCTIONS

## 2023-02-23 NOTE — ED PROVIDER NOTES
Encounter Date: 2/23/2023       History     Chief Complaint   Patient presents with    Shoulder Pain     56 yo female to triage for left shoulder pain, right foot pain and shin pain, and intermittent sharp shooting center chest tightness all for about 2 weeks. Pt says the sharp pain only last for a few seconds and subside, no pain at this time. Denies injury/trauma, N/V/D, SOB. NAD, AAOx4    Foot Pain     57-year-old female with no pertinent past medical history presents to the emergency department with multiple complaints.  She is primarily here for 2 month history of atraumatic sharp and positional left lateral shoulder pain.  Symptoms are intermittent.  No relief with OTC meds, prompting arrival to the ED. 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  Denies numbness, fever, and neck pain.  Denies chest pain (clarified nursing note), shortness of breath, visual disturbance, nausea, and vomiting.    Additional complaint of sharp and positional bilateral plantar foot pain for 2-3 months.  Pain will occasionally radiate to the right shin.  Denies calf and popliteal space pain.  No history of VTE.  Atraumatic.  Symptoms worse at the end of her shift.  Denies new footwear.    The history is provided by the patient.   Review of patient's allergies indicates:  No Known Allergies  Past Medical History:   Diagnosis Date    Hypertension      Past Surgical History:   Procedure Laterality Date    LAPAROSCOPIC SALPINGECTOMY Bilateral 1/19/2022    Procedure: SALPINGECTOMY, LAPAROSCOPIC;  Surgeon: Mike Manzo MD;  Location: [facility] OR;  Service: OB/GYN;  Laterality: Bilateral;    LAPAROSCOPIC TOTAL HYSTERECTOMY N/A 1/19/2022    Procedure: HYSTERECTOMY, TOTAL, LAPAROSCOPIC;  Surgeon: Mike Manzo MD;  Location: [facility] OR;  Service: OB/GYN;  Laterality: N/A;  RN PREOP 1/10/2022   COVID---NEGATIVE, UPT--NEG, T/S ON 1/18/2022     Family History   Problem Relation Age of Onset     Hypertension Mother     Cancer Mother     HIV Mother     Hypertension Father     Heart disease Father     Hypertension Maternal Grandmother     Alzheimer's disease Maternal Grandmother      Social History     Tobacco Use    Smoking status: Former     Types: Cigarettes     Quit date: 1998     Years since quittin.1    Smokeless tobacco: Never   Substance Use Topics    Alcohol use: Yes     Comment: OCCASIONAL    Drug use: No     Review of Systems   Constitutional:  Negative for fever.   Respiratory:  Negative for shortness of breath.    Cardiovascular:  Negative for chest pain.   Gastrointestinal:  Negative for abdominal pain, nausea and vomiting.   Musculoskeletal:  Positive for arthralgias and myalgias. Negative for back pain, joint swelling and neck pain.   Skin:  Negative for color change and wound.   Neurological:  Negative for numbness.   All other systems reviewed and are negative.    Physical Exam     Initial Vitals [23 1611]   BP Pulse Resp Temp SpO2   128/87 84 17 98.9 °F (37.2 °C) 99 %      MAP       --         Physical Exam    Nursing note and vitals reviewed.  Constitutional: She appears well-developed and well-nourished. She is not diaphoretic. No distress.   HENT:   Head: Atraumatic.   Right Ear: External ear normal.   Left Ear: External ear normal.   Eyes: Conjunctivae and EOM are normal.   Neck: No tracheal deviation present.   Normal range of motion.  Cardiovascular:  Normal rate and regular rhythm.           Pulmonary/Chest: No accessory muscle usage or stridor. No tachypnea. No respiratory distress.   Musculoskeletal:         General: No edema. Normal range of motion.      Cervical back: Normal range of motion.      Comments: Tenderness to the left lateral shoulder without bony tenderness or overlying skin changes.  No clavicular tenderness or asymmetry.  Full ROM of left shoulder, both reproduction of pain.  Equal  strength.  Radial pulses 2+ and equal.  No C-spine  tenderness.    Reproduction of pain to plantar feet bilaterally with dorsiflexion of toes.  No skin changes to plantar surfaces.  Distal skin perfusion normal.  No popliteal space or calf tenderness/swelling.  Ambulatory.     Neurological: She is alert and oriented to person, place, and time. She displays no tremor. She displays no seizure activity. Coordination and gait normal.   Skin: Skin is intact. Capillary refill takes less than 2 seconds. No rash noted. No erythema.       ED Course   Procedures  Labs Reviewed   POCT GLUCOSE     EKG Readings: (Independently Interpreted)   Initial Reading: No STEMI. Rhythm: Normal Sinus Rhythm. Heart Rate: 81. Axis: Normal.     Imaging Results              X-Ray Shoulder Trauma Left (Final result)  Result time 02/23/23 17:18:21      Final result by Alexandra Gonsalez MD (02/23/23 17:18:21)                   Impression:      No acute osseous abnormality identified.      Electronically signed by: Alexandra Gonsalez MD  Date:    02/23/2023  Time:    17:18               Narrative:    EXAMINATION:  XR SHOULDER TRAUMA 3 VIEW LEFT    CLINICAL HISTORY:  Pain in left shoulder    TECHNIQUE:  Three views of the left shoulder were performed.    COMPARISON  April 2009.    FINDINGS:  No evidence of acute displaced fracture, dislocation, or osseous destructive process.  Minor degenerative changes are seen of the glenohumeral and acromioclavicular joints.                                       Medications   ketorolac injection 30 mg (30 mg Intramuscular Given 2/23/23 1715)   predniSONE tablet 60 mg (60 mg Oral Given 2/23/23 1711)   orphenadrine 12 hr tablet 100 mg (100 mg Oral Given 2/23/23 1711)     Medical Decision Making:   History:   Old Medical Records: I decided to obtain old medical records.  ED Management:  Multiple complaints that overall seem musculoskeletal in nature.  Atraumatic.  Patient requesting x-ray; will plan to discharge home with supportive care and orthopedic follow-up as an  outpatient assuming normal.  No vascular compromise or evidence of infectious process.  No radicular symptoms.  Foot pain most consistent with plantar fasciitis.  No ischemic digits.  No signs of DVT.  No deficits.  Ambulatory.                        Clinical Impression:   Final diagnoses:  [M25.512] Left shoulder pain  [M19.012] Osteoarthritis of left shoulder, unspecified osteoarthritis type (Primary)  [M72.2] Plantar fasciitis        ED Disposition Condition    Discharge Stable          ED Prescriptions       Medication Sig Dispense Start Date End Date Auth. Provider    predniSONE (DELTASONE) 20 MG tablet Take 3 tablets (60 mg total) by mouth once daily. for 3 days 9 tablet 2/23/2023 2/26/2023 Rosendo Smith PA-C    cyclobenzaprine (FLEXERIL) 10 MG tablet Take 1 tablet (10 mg total) by mouth 3 (three) times daily as needed for Muscle spasms. 12 tablet 2/23/2023 3/5/2023 Rosendo Smith PA-C    meloxicam (MOBIC) 7.5 MG tablet Take 1 tablet (7.5 mg total) by mouth once daily. 12 tablet 2/23/2023 -- Rosendo Smith PA-C          Follow-up Information       Follow up With Specialties Details Why Contact Info    Rudy Cardoza, [facility] Schedule an appointment as soon as possible for a visit in 1 day For re-evaluation [address] [address]  [phone]      [facility] Orthopedics Schedule an appointment as soon as possible for a visit in 1 day For further evaluation of your orthopedic injury [address]  [facility] [address]  [phone]    [facility] Surgical Oncology, Orthopedic Surgery, Genetics, Physical Medicine and Rehabilitation, Occupational Therapy, Radiology Go in 1 day as an alternative to specialist evaluation [address] [place] [address]  [phone]      [facility] Emergency Dept Emergency Medicine Go to  If symptoms worsen [address]  [facility] [address] [phone]              Rosendo Smith PA-C  02/23/23 1735

## 2023-02-23 NOTE — ED TRIAGE NOTES
Pt reported to ED with c/o of left shoulder pain And right leg pain. Also reports chest pain x2 weeks that's occurring intermittently. AAOx4, vSS.

## 2023-08-09 NOTE — ED TRIAGE NOTES
Patient stated that she has right sided gum pain which now is radiating into her right ear for 2-3 days.  Pain 8/10. Throbbing and aching in nature. Patient took Aleve, Tylenol and Goody powder with no relief.   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

## 2023-09-23 ENCOUNTER — HOSPITAL ENCOUNTER (EMERGENCY)
Facility: HOSPITAL | Age: 58
Discharge: HOME | End: 2023-09-23
Attending: EMERGENCY MEDICINE
Payer: MEDICAID

## 2023-09-23 VITALS
SYSTOLIC BLOOD PRESSURE: 124 MMHG | RESPIRATION RATE: 18 BRPM | HEART RATE: 71 BPM | OXYGEN SATURATION: 98 % | DIASTOLIC BLOOD PRESSURE: 81 MMHG | TEMPERATURE: 98 F | WEIGHT: 170 LBS | HEIGHT: 63 IN | BODY MASS INDEX: 30.12 KG/M2

## 2023-09-23 DIAGNOSIS — M25.511 ACUTE PAIN OF RIGHT SHOULDER: ICD-10-CM

## 2023-09-23 DIAGNOSIS — R82.998 LEUKOCYTES IN URINE: ICD-10-CM

## 2023-09-23 DIAGNOSIS — K06.8 PAIN IN GUMS: ICD-10-CM

## 2023-09-23 DIAGNOSIS — R10.31 RLQ ABDOMINAL PAIN: Primary | ICD-10-CM

## 2023-09-23 LAB
BACTERIA #/AREA URNS HPF: ABNORMAL /HPF
BILIRUB UR QL STRIP: NEGATIVE
CLARITY UR: CLEAR
COLOR UR: YELLOW
GLUCOSE UR QL STRIP: NEGATIVE
HGB UR QL STRIP: ABNORMAL
KETONES UR QL STRIP: NEGATIVE
LEUKOCYTE ESTERASE UR QL STRIP: ABNORMAL
MICROSCOPIC COMMENT: ABNORMAL
NITRITE UR QL STRIP: POSITIVE
PH UR STRIP: 6 [PH] (ref 5–8)
PROT UR QL STRIP: NEGATIVE
RBC #/AREA URNS HPF: 4 /HPF (ref 0–4)
SP GR UR STRIP: 1.02 (ref 1–1.03)
SQUAMOUS #/AREA URNS HPF: 0 /HPF
URN SPEC COLLECT METH UR: ABNORMAL
UROBILINOGEN UR STRIP-ACNC: NEGATIVE EU/DL
WBC #/AREA URNS HPF: 9 /HPF (ref 0–5)

## 2023-09-23 PROCEDURE — 96372 THER/PROPH/DIAG INJ SC/IM: CPT | Performed by: PHYSICIAN ASSISTANT

## 2023-09-23 PROCEDURE — 81000 URINALYSIS NONAUTO W/SCOPE: CPT | Performed by: PHYSICIAN ASSISTANT

## 2023-09-23 PROCEDURE — 63600175 PHARM REV CODE 636 W HCPCS: Performed by: PHYSICIAN ASSISTANT

## 2023-09-23 PROCEDURE — 99284 EMERGENCY DEPT VISIT MOD MDM: CPT

## 2023-09-23 RX ORDER — CEPHALEXIN 500 MG/1
500 CAPSULE ORAL EVERY 12 HOURS
Qty: 20 CAPSULE | Refills: 0 | Status: SHIPPED | OUTPATIENT
Start: 2023-09-23 | End: 2023-10-03

## 2023-09-23 RX ORDER — MELOXICAM 7.5 MG/1
7.5 TABLET ORAL DAILY
Qty: 12 TABLET | Refills: 0 | Status: SHIPPED | OUTPATIENT
Start: 2023-09-23

## 2023-09-23 RX ORDER — KETOROLAC TROMETHAMINE 30 MG/ML
30 INJECTION, SOLUTION INTRAMUSCULAR; INTRAVENOUS
Status: COMPLETED | OUTPATIENT
Start: 2023-09-23 | End: 2023-09-23

## 2023-09-23 RX ADMIN — KETOROLAC TROMETHAMINE 30 MG: 30 INJECTION, SOLUTION INTRAMUSCULAR; INTRAVENOUS at 05:09

## 2023-09-23 NOTE — DISCHARGE INSTRUCTIONS
Thank you for coming to our Emergency Department today. It is important to remember that some problems or medical conditions are difficult to diagnose and may not be found or addressed during your Emergency Department visit.  These conditions often start with non-specific symptoms and can only be diagnosed on follow up visits with your primary care physician or specialist when the symptoms continue or change. Please remember that all medical conditions can change, and we cannot predict how you will be feeling tomorrow or the next day. Return to the ER with any questions/concerns, new/concerning symptoms, worsening or failure to improve.       Be sure to follow up with your primary care doctor and review all labs/imaging/tests that were performed during your ER visit with them. It is very common for us to identify non-emergent incidental findings which must be followed up with your primary care physician.  Some labs/imaging/tests may be outside of the normal range, and require non-emergent follow-up and/or further investigation/treatment/procedures/testing to help diagnose/exclude/prevent complications or other potentially serious medical conditions. Some abnormalities may not have been discussed or addressed during your ER visit.     An ER visit does not replace a primary care visit, and many screening tests or follow-up tests cannot be ordered by an ER doctor or performed by the ER. Some tests may even require pre-approval.    If you do not have a primary care doctor, you may contact the one listed on your discharge paperwork or you may also call the [facility] Appointment Desk at [phone] , or [address] [place] [address] at  [phone] to schedule an appointment, or establish care with a primary care doctor or even a specialist and to obtain information about local resources. It is important to your health that you have a primary care doctor.    Please take all medications as directed. We have done our best to select  a medication for you that will treat your condition however, all medications may potentially have side-effects and it is impossible to predict which medications may give you side-effects or what those side-effects (if any) those medications may give you.  If you feel that you are having a negative effect or side-effect of any medication you should stop taking those medications immediately and seek medical attention. If you feel that you are having a life-threatening reaction call 911.        Do not drive, swim, climb to height, take a bath, operate heavy machinery, drink alcohol or take potentially sedating medications, sign any legal documents or make any important decisions for 24 hours if you have received any pain medications, sedatives or mood altering drugs during your ER visit or within 24 hours of taking them if they have been prescribed to you.     You can find additional resources for Dentists, hearing aids, durable medical equipment, low cost pharmacies and other resources at [email or web address]

## 2023-09-23 NOTE — ED PROVIDER NOTES
Encounter Date: 9/23/2023    SCRIBE #1 NOTE: IDipti, lilli scribing for, and in the presence of,  Rosendo Smith PA-C. I have scribed the following portions of the note - Other sections scribed: HPI, ROS.       History     Chief Complaint   Patient presents with    Abdominal Pain     Pt to ER with multiple complaints. Pt  reports of right lower abd pain radiating down right leg, boil to top gums, right shoulder pain x months.      Lupe Paredes is a 57 y.o. female, with a PMHx of HTN and a PSHx of laparoscopic total hysterectomy and laparoscopic bilateral salpingectomy, who presents to the ED with RLQ abdominal pain radiating to the right hip and right leg for 2-3 months. Patient endorses dysuria for 1 month, right shoulder pain, and abscess to the left upper gingiva for 5 days. 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 Patient denies any trauma or injury. Denies nausea, vomiting, hematuria, back pain, or other associated symptoms.   Per internal medical records, patient visited the ED on 11/13/17 for RLQ abdominal pain, lumbar back pain, and frequency. Patient was prescribed 500 mg cephalexin every 12 hours.  Patient visited the ED on 11/29/15 for lower abdominal pain, nausea, and diarrhea. Patient was prescribed 20 mg dicyclomine BID and 375 mg naproxen BID.    The history is provided by the patient. No  was used.     Review of patient's allergies indicates:  No Known Allergies  Past Medical History:   Diagnosis Date    Hypertension      Past Surgical History:   Procedure Laterality Date    LAPAROSCOPIC SALPINGECTOMY Bilateral 1/19/2022    Procedure: SALPINGECTOMY, LAPAROSCOPIC;  Surgeon: Mike Manzo MD;  Location: [facility] OR;  Service: OB/GYN;  Laterality: Bilateral;    LAPAROSCOPIC TOTAL HYSTERECTOMY N/A 1/19/2022    Procedure: HYSTERECTOMY, TOTAL, LAPAROSCOPIC;  Surgeon: Mike Manzo MD;  Location: [facility] OR;  Service:  OB/GYN;  Laterality: N/A;  RN PREOP 1/10/2022   COVID---NEGATIVE, UPT--NEG, T/S ON 2022     Family History   Problem Relation Age of Onset    Hypertension Mother     Cancer Mother     HIV Mother     Hypertension Father     Heart disease Father     Hypertension Maternal Grandmother     Alzheimer's disease Maternal Grandmother      Social History     Tobacco Use    Smoking status: Former     Current packs/day: 0.00     Types: Cigarettes     Quit date: 1998     Years since quittin.7    Smokeless tobacco: Never   Substance Use Topics    Alcohol use: Yes     Comment: OCCASIONAL    Drug use: No     Review of Systems   Constitutional:  Negative for fever.   HENT:  Positive for dental problem. Negative for congestion, sore throat and trouble swallowing.    Respiratory:  Negative for cough and shortness of breath.    Cardiovascular:  Negative for chest pain.   Gastrointestinal:  Positive for abdominal pain (RLQ). Negative for constipation, diarrhea, nausea and vomiting.   Genitourinary:  Positive for dysuria. Negative for flank pain, frequency, hematuria and urgency.   Musculoskeletal:  Positive for arthralgias (Right hip and right shoulder) and myalgias (Right leg). Negative for back pain.   Skin:  Negative for rash.   Neurological:  Negative for headaches.   All other systems reviewed and are negative.      Physical Exam     Initial Vitals [23 1643]   BP Pulse Resp Temp SpO2   (!) 140/77 93 18 98.3 °F (36.8 °C) 98 %      MAP       --         Physical Exam    Nursing note and vitals reviewed.  Constitutional: She appears well-developed and well-nourished. She is not diaphoretic. No distress.   HENT:   Head: Atraumatic.   Right Ear: External ear normal.   Left Ear: External ear normal.   Permanent artificial dentition to maxillary central incisors.  Gumline and oropharynx normal.   Eyes: Conjunctivae and EOM are normal.   Neck: No tracheal deviation present.   Normal range of motion.  Cardiovascular:   Normal rate and regular rhythm.           Pulmonary/Chest: No accessory muscle usage or stridor. No tachypnea. No respiratory distress.   Abdominal: Abdomen is soft. She exhibits no distension. There is no abdominal tenderness.   No right CVA tenderness.  No left CVA tenderness. There is no rebound, no guarding, no tenderness at McBurney's point and negative Bryan's sign. negative Rovsing's sign  Musculoskeletal:         General: No tenderness or edema. Normal range of motion.      Cervical back: Normal range of motion.     Neurological: She is alert and oriented to person, place, and time. She displays no tremor. She displays no seizure activity. Coordination and gait normal.   Skin: Skin is intact. Capillary refill takes less than 2 seconds. No rash noted. No erythema.         ED Course   Procedures  Labs Reviewed   URINALYSIS, REFLEX TO URINE CULTURE - Abnormal; Notable for the following components:       Result Value    Occult Blood UA 1+ (*)     Nitrite, UA Positive (*)     Leukocytes, UA Trace (*)     All other components within normal limits    Narrative:     Specimen Source->Urine   URINALYSIS MICROSCOPIC - Abnormal; Notable for the following components:    WBC, UA 9 (*)     All other components within normal limits    Narrative:     Specimen Source->Urine          Imaging Results    None          Medications   ketorolac injection 30 mg (30 mg Intramuscular Given 9/23/23 1716)     Medical Decision Making  Multiple presumably unrelated complaints.  Primarily here for 3 months of atraumatic right lower quadrant abdominal pain.  Associated dysuria.  Leukocyte on urinalysis; will treat for UTI.  No clinical evidence of pyelonephritis.  No urosepsis.  May also have a component of osteoarthritis to the right hip that is contributing to her pain today.  Right shoulder suspicious for osteoarthritis.  Atraumatic.  No septic joint or vascular compromise.  No radicular symptoms.  No signs of injury to the dentition.   Specifically no abscess.    Risk  Prescription drug management.            Scribe Attestation:   Scribe #1: I performed the above scribed service and the documentation accurately describes the services I performed. I attest to the accuracy of the note.                      I, Rosendo Smith PA-C, personally performed the services described in this documentation. All medical record entries made by the scribe were at my direction and in my presence. I have reviewed the chart and agree that the record reflects my personal performance and is accurate and complete.    Clinical Impression:   Final diagnoses:  [R10.31] RLQ abdominal pain (Primary)  [M25.511] Acute pain of right shoulder  [K06.8] Pain in gums  [R82.998] Leukocytes in urine        ED Disposition Condition    Discharge Stable          ED Prescriptions       Medication Sig Dispense Start Date End Date Auth. Provider    cephALEXin (KEFLEX) 500 MG capsule Take 1 capsule (500 mg total) by mouth every 12 (twelve) hours. for 10 days 20 capsule 9/23/2023 10/3/2023 Rosendo Smith PA-C    meloxicam (MOBIC) 7.5 MG tablet Take 1 tablet (7.5 mg total) by mouth once daily. 12 tablet 9/23/2023 -- Rosendo Smith PA-C          Follow-up Information       Follow up With Specialties Details Why Contact Info    Rudy Cardoza NP Family Medicine Schedule an appointment as soon as possible for a visit in 2 days For re-evaluation [address] [facility] [address]  [facility] [address]  [phone]      [facility] - Emergency Dept Emergency Medicine Go to  If symptoms worsen or new symptoms develop [address] [facility] [id number] [phone]             Rosendo Smith PA-C  09/23/23 [id number]

## 2024-02-28 ENCOUNTER — HOSPITAL ENCOUNTER (EMERGENCY)
Facility: HOSPITAL | Age: 59
Discharge: HOME | End: 2024-02-28
Attending: EMERGENCY MEDICINE
Payer: MEDICAID

## 2024-02-28 VITALS
RESPIRATION RATE: 16 BRPM | SYSTOLIC BLOOD PRESSURE: 160 MMHG | DIASTOLIC BLOOD PRESSURE: 90 MMHG | HEART RATE: 80 BPM | TEMPERATURE: 98 F | HEIGHT: 62 IN | OXYGEN SATURATION: 99 % | WEIGHT: 245 LBS | BODY MASS INDEX: 45.08 KG/M2

## 2024-02-28 DIAGNOSIS — M54.50 ACUTE BILATERAL LOW BACK PAIN, UNSPECIFIED WHETHER SCIATICA PRESENT: ICD-10-CM

## 2024-02-28 DIAGNOSIS — M79.605 PAIN IN BOTH LOWER EXTREMITIES: Primary | ICD-10-CM

## 2024-02-28 DIAGNOSIS — M79.604 PAIN IN BOTH LOWER EXTREMITIES: Primary | ICD-10-CM

## 2024-02-28 PROCEDURE — 99284 EMERGENCY DEPT VISIT MOD MDM: CPT | Mod: 25

## 2024-02-28 PROCEDURE — 63600175 PHARM REV CODE 636 W HCPCS: Performed by: NURSE PRACTITIONER

## 2024-02-28 PROCEDURE — 96372 THER/PROPH/DIAG INJ SC/IM: CPT | Performed by: NURSE PRACTITIONER

## 2024-02-28 RX ORDER — IBUPROFEN 600 MG/1
600 TABLET ORAL EVERY 6 HOURS PRN
Qty: 20 TABLET | Refills: 0 | Status: SHIPPED | OUTPATIENT
Start: 2024-02-29

## 2024-02-28 RX ORDER — KETOROLAC TROMETHAMINE 30 MG/ML
30 INJECTION, SOLUTION INTRAMUSCULAR; INTRAVENOUS
Status: COMPLETED | OUTPATIENT
Start: 2024-02-28 | End: 2024-02-28

## 2024-02-28 RX ORDER — ORPHENADRINE CITRATE 100 MG/1
100 TABLET, EXTENDED RELEASE ORAL 2 TIMES DAILY PRN
Qty: 20 TABLET | Refills: 0 | Status: SHIPPED | OUTPATIENT
Start: 2024-02-28 | End: 2024-03-09

## 2024-02-28 RX ORDER — LIDOCAINE 50 MG/G
1 PATCH TOPICAL DAILY
Qty: 15 PATCH | Refills: 0 | Status: SHIPPED | OUTPATIENT
Start: 2024-02-28

## 2024-02-28 RX ADMIN — KETOROLAC TROMETHAMINE 30 MG: 30 INJECTION, SOLUTION INTRAMUSCULAR; INTRAVENOUS at 05:02

## 2024-02-28 NOTE — DISCHARGE INSTRUCTIONS
You have been prescribed NORFLEX for pain. Please do not take this medication while working, drinking alcohol, swimming, or while driving/operating heavy machinery. This medication may cause drowsiness, impair judgment, and reduce physical capabilities.    You have been prescribed Ibuprofen for pain. This is an Non-Steroidal Anti-Inflammatory (NSAID) Medication. Please do not take any additional NSAIDs while you are taking this medication including (Advil, Aleve, Motrin, Ibuprofen, Mobic\meloxicam, Naprosyn, etc.). Please stop taking this medication if you experience: weakness, itching, yellow skin or eyes, joint pains, vomiting blood, blood or black stools, unusual weight gain, or swelling in your arms, legs, hands, or feet.     Thank you for coming to our Emergency Department today. It is important to remember that some problems or medical conditions are difficult to diagnose and may not be found during your Emergency Department visit.     Be sure to follow up with your primary care doctor and review all labs/imaging/tests that were performed during your ER visit with them. Some labs/tests may be outside of the normal range and require non-emergent follow-up and further investigation to help diagnose/exclude/prevent complications or other potentially serious medical conditions that were not addressed during your ER visit.    If you do not have a primary care doctor, you may contact the one listed on your discharge paperwork or you may also call the [facility] Appointment Desk at [phone] to schedule an appointment and establish care with one. It is important to your health that you have a primary care doctor.    Please take all medications as directed. All medications may potentially have side-effects and it is impossible to predict which medications may give you side-effects or what side-effects (if any) they will give you.. If you feel that you are having a negative effect or side-effect of any  medication you should immediately stop taking them and seek medical attention. If you feel that you are having a life-threatening reaction call 911.    Return to the ER with any questions/concerns, new/concerning symptoms, worsening or failure to improve.     Do not drive, swim, climb to height, take a bath, operate heavy machinery, drink alcohol or take potentially sedating medications, sign any legal documents or make any important decisions for 24 hours if you have received any pain medications, sedatives or mood altering drugs during your ER visit or within 24 hours of taking them if they have been prescribed to you.     You can find additional resources for Dentists, hearing aids, durable medical equipment, low cost pharmacies and other resources at [email or web address]    BELOW THIS LINE ONLY APPLIES IF YOU HAVE A COVID TEST PENDING OR IF YOU HAVE BEEN DIAGNOSED WITH COVID:  Please access [facility] to review the results of your test. Until the results of your COVID test return, you should isolate yourself so as not to potentially spread illness to others.   If your COVID test returns positive, you should isolate yourself so as not to spread illness to others. After five full days, if you are feeling better and you have not had fever for 24 hours, you can return to your typical daily activities, but you must wear a mask around others for an additional 5 days.   If your COVID test returns negative and you are either unvaccinated or more than six months out from your two-dose vaccine and are not yet boosted, you should still quarantine for 5 full days followed by strict mask use for an additional 5 full days.   If your COVID test returns negative and you have received your 2-dose initial vaccine as well as a booster, you should continue strict mask use for 10 full days after the exposure.  For all those exposed, best practice includes a test at day 5 after the exposure. This can be a home test or a test  through one of the many testing centers throughout our community.   Masking is always advised to limit the spread of COVID. Cdc.gov is an excellent site to obtain the latest up to date recommendations regarding COVID and COVID testing.     CDC Testing and Quarantine Guidelines for patients with exposure to a known-positive COVID-19 person:  A close exposure is defined as anyone who has had an exposure (masked or unmasked) to a known COVID -19 positive person within 6 feet of someone for a cumulative total of 15 minutes or more over a 24-hour period.   Vaccinated and/or if you recently had a positive covid test within 90 days do NOT need to quarantine after contact with someone who had COVID-19 unless you develop symptoms.   Fully vaccinated people who have not had a positive test within 90 days, should get tested 3-5 days after their exposure, even if they don't have symptoms and wear a mask indoors in public for 14 days following exposure or until their test result is negative.      Unvaccinated and/or NOT had a positive test within 90 days and meet close exposure  You are required by CDC guidelines to quarantine for at least 5 days from time of exposure followed by 5 days of strict masking. It is recommended, but not required to test after 5 days, unless you develop symptoms, in which case you should test at that time.  If you get tested after 5 days and your test is positive, your 5 day period of isolation starts the day of the positive test.    If your exposure does not meet the above definition, you can return to your normal daily activities to include social distancing, wearing a mask and frequent handwashing.      Here is a link to guidance from the CDC:  https://www.cdc.gov/media/releases/2021/s1227-isolation-quarantine-guidance.html      [place] Dept Of Health Testing Sites:  https://ldh.la.gov/page/3934      [facility] website with testing locations and guidance:  [email or web address]

## 2024-02-28 NOTE — ED PROVIDER NOTES
"Encounter Date: 2/28/2024       History     Chief Complaint   Patient presents with    Leg Pain     Pt to ED with complaints of bilateral leg pain X 2 weeks. Pt states it does not bother her until she is standing for long periods of time. Pt states day to day walking "does not really hurt." Denies symptoms ever occurring before. Ambulatory in triage.      CC: Leg pain    HPI:  58-year-old female with hypertension and prediabetes presenting to the ED for evaluation of 2 week history of bilateral leg pain.  Patient reports pain begins in the low back and lateral buttocks and radiates down the backs of both legs.  The pain is worse when she changes position, especially from sitting to standing.  No pain at rest.  Denies any injury or trauma.  Denies any fever, chills, abdominal pain, urinary symptoms, bowel or bladder incontinence, saddle anesthesia.  Denies any extremity swelling.  No attempted treatment.    The history is provided by the patient. No  was used.     Review of patient's allergies indicates:  No Known Allergies  Past Medical History:   Diagnosis Date    Hypertension      Past Surgical History:   Procedure Laterality Date    LAPAROSCOPIC SALPINGECTOMY Bilateral 1/19/2022    Procedure: SALPINGECTOMY, LAPAROSCOPIC;  Surgeon: Mike Manzo MD;  Location: [facility] OR;  Service: OB/GYN;  Laterality: Bilateral;    LAPAROSCOPIC TOTAL HYSTERECTOMY N/A 1/19/2022    Procedure: HYSTERECTOMY, TOTAL, LAPAROSCOPIC;  Surgeon: Mike Manzo MD;  Location: [facility] OR;  Service: OB/GYN;  Laterality: N/A;  RN PREOP 1/10/2022   COVID---NEGATIVE, UPT--NEG, T/S ON 1/18/2022     Family History   Problem Relation Age of Onset    Hypertension Mother     Cancer Mother     HIV Mother     Hypertension Father     Heart disease Father     Hypertension Maternal Grandmother     Alzheimer's disease Maternal Grandmother      Social History     Tobacco Use    Smoking status: Former     Current packs/day: 0.00     Types: " Cigarettes     Quit date: 1998     Years since quittin.1    Smokeless tobacco: Never   Substance Use Topics    Alcohol use: Yes     Comment: OCCASIONAL    Drug use: No     Review of Systems   Constitutional:  Negative for chills and fever.   HENT:  Negative for sore throat.    Respiratory:  Negative for shortness of breath.    Cardiovascular:  Negative for chest pain.   Gastrointestinal:  Negative for abdominal pain, nausea and vomiting.   Genitourinary:  Negative for dysuria.   Musculoskeletal:  Positive for arthralgias and back pain.   Skin:  Negative for rash.   Neurological:  Negative for weakness.   Hematological:  Does not bruise/bleed easily.       Physical Exam     Initial Vitals [24 1701]   BP Pulse Resp Temp SpO2   (!) 160/90 80 16 98.2 °F (36.8 °C) 99 %      MAP       --         Physical Exam    Constitutional: She appears well-developed and well-nourished. She is not diaphoretic. No distress.   HENT:   Head: Normocephalic and atraumatic.   Neck:   Normal range of motion.  Cardiovascular:  Normal rate, regular rhythm, normal heart sounds and intact distal pulses.           Pulses:       Dorsalis pedis pulses are 2+ on the right side and 2+ on the left side.   Pulmonary/Chest: Breath sounds normal. No respiratory distress.   Abdominal: Abdomen is soft. Bowel sounds are normal. There is no abdominal tenderness.   Musculoskeletal:         General: Normal range of motion.      Cervical back: Normal range of motion.      Comments: Ambulatory with normal gait.  5/5 strength of the bilateral upper and lower extremities with sensation intact.  There is tenderness with palpation of the paraspinal lumbar musculature.  No midline tenderness.       Neurological: She is alert and oriented to person, place, and time.   Skin: Skin is warm and dry.   Psychiatric: She has a normal mood and affect. Her behavior is normal.         ED Course   Procedures  Labs Reviewed - No data to display       Imaging  Results    None          Medications   ketorolac injection 30 mg (has no administration in time range)     Medical Decision Making  58-year-old female presenting to the ED with bilateral leg pain with associated low back pain that is intermittent and worse with changes in position and movement.  Differentials include musculoskeletal pain, sciatica, DVT, rhabdomyolysis, venous insufficiency, peripheral arterial disease, others.  Denies any abdominal pain, bowel or bladder issues, saddle anesthesia, numbness, tingling, extremity swelling, chest pain or shortness breath.  On exam, she is pleasant well-appearing.  Vital signs are stable and reassuring.  There is some tenderness palpation of the paraspinal lumbar musculature.  No midline tenderness.  5/5 strength of the bilateral upper lower extremities with sensation intact.  Extremities without swelling.  Suspect musculoskeletal back pain with sciatica.  Given Toradol injection in the ED.  Will discharge home with NSAIDs and muscle relaxers.  Return precautions discussed with the patient who verbalized understanding.  She is agreeable to plan of care.    Risk  Prescription drug management.                                      Clinical Impression:  Final diagnoses:  [M79.604, M79.605] Pain in both lower extremities (Primary)  [M54.50] Acute bilateral low back pain, unspecified whether sciatica present          ED Disposition Condition    Discharge Stable          ED Prescriptions       Medication Sig Dispense Start Date End Date Auth. Provider    orphenadrine (NORFLEX) 100 mg tablet Take 1 tablet (100 mg total) by mouth 2 (two) times daily as needed for Muscle spasms or Pain. 20 tablet 2/28/2024 3/9/2024 Gypsy Smith, BARNEY    ibuprofen (ADVIL,MOTRIN) 600 MG tablet Take 1 tablet (600 mg total) by mouth every 6 (six) hours as needed for Pain. 20 tablet 2/29/2024 -- Gypsy Smith NP    LIDOcaine (LIDODERM) 5 % Place 1 patch onto the skin once daily. Remove & Discard patch  within 12 hours or as directed by MD 15 patch 2/28/2024 -- Gypsy Smith NP          Follow-up Information       Follow up With Specialties Details Why Contact Info    Rudy Cardoza NP Family Medicine Schedule an appointment as soon as possible for a visit  For follow-up [address] [facility] [address]  [facility] [address]  [phone]      [facility] - Emergency Dept Emergency Medicine Go to  If symptoms worsen [address] [facility] [place] [address]  [phone]             Gypys Smith NP  02/28/24 8038

## 2024-02-29 NOTE — ED TRIAGE NOTES
C/o pain to right buttock radiating down right leg. Denies known trauma. Reports pain worse with activity. Cms intact distally.

## 2024-04-12 ENCOUNTER — HOSPITAL ENCOUNTER (EMERGENCY)
Facility: HOSPITAL | Age: 59
Discharge: HOME | End: 2024-04-12
Attending: EMERGENCY MEDICINE
Payer: MEDICAID

## 2024-04-12 VITALS
WEIGHT: 204 LBS | DIASTOLIC BLOOD PRESSURE: 80 MMHG | SYSTOLIC BLOOD PRESSURE: 133 MMHG | BODY MASS INDEX: 37.31 KG/M2 | OXYGEN SATURATION: 100 % | RESPIRATION RATE: 18 BRPM | TEMPERATURE: 99 F | HEART RATE: 78 BPM

## 2024-04-12 DIAGNOSIS — A08.4 VIRAL GASTROENTERITIS: Primary | ICD-10-CM

## 2024-04-12 LAB
ALBUMIN SERPL BCP-MCNC: 3.9 G/DL (ref 3.5–5.2)
ALP SERPL-CCNC: 94 U/L (ref 55–135)
ALT SERPL W/O P-5'-P-CCNC: 36 U/L (ref 10–44)
ANION GAP SERPL CALC-SCNC: 5 MMOL/L (ref 8–16)
AST SERPL-CCNC: 26 U/L (ref 10–40)
BASOPHILS # BLD AUTO: 0.01 K/UL (ref 0–0.2)
BASOPHILS NFR BLD: 0.2 % (ref 0–1.9)
BILIRUB SERPL-MCNC: 0.2 MG/DL (ref 0.1–1)
BILIRUB UR QL STRIP: NEGATIVE
BUN SERPL-MCNC: 22 MG/DL (ref 6–20)
CALCIUM SERPL-MCNC: 9.4 MG/DL (ref 8.7–10.5)
CHLORIDE SERPL-SCNC: 111 MMOL/L (ref 95–110)
CLARITY UR: CLEAR
CO2 SERPL-SCNC: 19 MMOL/L (ref 23–29)
COLOR UR: YELLOW
CREAT SERPL-MCNC: 0.8 MG/DL (ref 0.5–1.4)
CTP QC/QA: YES
DIFFERENTIAL METHOD BLD: NORMAL
EOSINOPHIL # BLD AUTO: 0 K/UL (ref 0–0.5)
EOSINOPHIL NFR BLD: 0.7 % (ref 0–8)
ERYTHROCYTE [DISTWIDTH] IN BLOOD BY AUTOMATED COUNT: 13.9 % (ref 11.5–14.5)
EST. GFR  (NO RACE VARIABLE): >60 ML/MIN/1.73 M^2
GLUCOSE SERPL-MCNC: 99 MG/DL (ref 70–110)
GLUCOSE UR QL STRIP: NEGATIVE
HCT VFR BLD AUTO: 43.2 % (ref 37–48.5)
HGB BLD-MCNC: 14 G/DL (ref 12–16)
HGB UR QL STRIP: ABNORMAL
IMM GRANULOCYTES # BLD AUTO: 0.01 K/UL (ref 0–0.04)
IMM GRANULOCYTES NFR BLD AUTO: 0.2 % (ref 0–0.5)
KETONES UR QL STRIP: NEGATIVE
LEUKOCYTE ESTERASE UR QL STRIP: NEGATIVE
LIPASE SERPL-CCNC: 46 U/L (ref 4–60)
LYMPHOCYTES # BLD AUTO: 2.3 K/UL (ref 1–4.8)
LYMPHOCYTES NFR BLD: 42 % (ref 18–48)
MCH RBC QN AUTO: 27.4 PG (ref 27–31)
MCHC RBC AUTO-ENTMCNC: 32.4 G/DL (ref 32–36)
MCV RBC AUTO: 85 FL (ref 82–98)
MICROSCOPIC COMMENT: NORMAL
MOLECULAR STREP A: NEGATIVE
MONOCYTES # BLD AUTO: 0.7 K/UL (ref 0.3–1)
MONOCYTES NFR BLD: 12.5 % (ref 4–15)
NEUTROPHILS # BLD AUTO: 2.4 K/UL (ref 1.8–7.7)
NEUTROPHILS NFR BLD: 44.4 % (ref 38–73)
NITRITE UR QL STRIP: NEGATIVE
NRBC BLD-RTO: 0 /100 WBC
PH UR STRIP: 6 [PH] (ref 5–8)
PLATELET # BLD AUTO: 243 K/UL (ref 150–450)
PMV BLD AUTO: 9.7 FL (ref 9.2–12.9)
POTASSIUM SERPL-SCNC: 3.7 MMOL/L (ref 3.5–5.1)
PROT SERPL-MCNC: 8.1 G/DL (ref 6–8.4)
PROT UR QL STRIP: ABNORMAL
RBC # BLD AUTO: 5.11 M/UL (ref 4–5.4)
RBC #/AREA URNS HPF: 2 /HPF (ref 0–4)
SODIUM SERPL-SCNC: 135 MMOL/L (ref 136–145)
SP GR UR STRIP: 1.02 (ref 1–1.03)
SQUAMOUS #/AREA URNS HPF: 1 /HPF
URN SPEC COLLECT METH UR: ABNORMAL
UROBILINOGEN UR STRIP-ACNC: NEGATIVE EU/DL
WBC # BLD AUTO: 5.36 K/UL (ref 3.9–12.7)

## 2024-04-12 PROCEDURE — 87651 STREP A DNA AMP PROBE: CPT

## 2024-04-12 PROCEDURE — 81000 URINALYSIS NONAUTO W/SCOPE: CPT

## 2024-04-12 PROCEDURE — 96374 THER/PROPH/DIAG INJ IV PUSH: CPT

## 2024-04-12 PROCEDURE — 99284 EMERGENCY DEPT VISIT MOD MDM: CPT | Mod: 25

## 2024-04-12 PROCEDURE — 85025 COMPLETE CBC W/AUTO DIFF WBC: CPT

## 2024-04-12 PROCEDURE — 96361 HYDRATE IV INFUSION ADD-ON: CPT

## 2024-04-12 PROCEDURE — 83690 ASSAY OF LIPASE: CPT

## 2024-04-12 PROCEDURE — 80053 COMPREHEN METABOLIC PANEL: CPT

## 2024-04-12 PROCEDURE — 63600175 PHARM REV CODE 636 W HCPCS

## 2024-04-12 RX ORDER — ONDANSETRON 4 MG/1
4 TABLET, ORALLY DISINTEGRATING ORAL EVERY 6 HOURS PRN
Qty: 14 TABLET | Refills: 0 | Status: SHIPPED | OUTPATIENT
Start: 2024-04-12

## 2024-04-12 RX ORDER — ONDANSETRON HYDROCHLORIDE 2 MG/ML
4 INJECTION, SOLUTION INTRAVENOUS
Status: COMPLETED | OUTPATIENT
Start: 2024-04-12 | End: 2024-04-12

## 2024-04-12 RX ADMIN — ONDANSETRON 4 MG: 2 INJECTION INTRAMUSCULAR; INTRAVENOUS at 10:04

## 2024-04-12 RX ADMIN — SODIUM CHLORIDE, POTASSIUM CHLORIDE, SODIUM LACTATE AND CALCIUM CHLORIDE 1000 ML: 600; 310; 30; 20 INJECTION, SOLUTION INTRAVENOUS at 10:04

## 2024-04-12 NOTE — Clinical Note
"Lupe Mackay" Lena was seen and treated in our emergency department on 4/12/2024.  She may return to work on 04/15/2024.       If you have any questions or concerns, please don't hesitate to call.      Andrew Zuniga PA-C"

## 2024-04-12 NOTE — ED NOTES
Pt. Reports for the past week she has been having diarrhea. Pt. Reports she had about 2 days ago and episode of emesis. Pt. Is noted with 2 bags of chips and water at bedside.

## 2024-04-12 NOTE — ED PROVIDER NOTES
Encounter Date: 4/12/2024    SCRIBE #1 NOTE: I, Soni Cheung, am scribing for, and in the presence of,  Andrew Zuniga PA-C. I have scribed the following portions of the note - Other sections scribed: HPI, ROS, PE.       History     Chief Complaint   Patient presents with    Diarrhea     Pt c/o diarrhea and nausea x 4 days. Denies fever. Vomiting 3 days ago. None since      Patient is a 58 y.o. female with a past medical history of HTN who presents to the Emergency Department for evaluation of nausea, vomiting, diarrhea x 4 days.  Denies hematemesis.  Denies blood in the stool.  Denies abdominal pain.  Patient states she vomited twice 3 days ago, but none since then. She has not taken any medications for the symptoms. 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 She denies cough, congestion, or sore throat. She denies hematuria, dysuria, vaginal bleeding, or vaginal discharge.    The history is provided by the patient. No  was used.     Review of patient's allergies indicates:  No Known Allergies  Past Medical History:   Diagnosis Date    Hypertension      Past Surgical History:   Procedure Laterality Date    LAPAROSCOPIC SALPINGECTOMY Bilateral 1/19/2022    Procedure: SALPINGECTOMY, LAPAROSCOPIC;  Surgeon: Mike Manzo MD;  Location: [facility] OR;  Service: OB/GYN;  Laterality: Bilateral;    LAPAROSCOPIC TOTAL HYSTERECTOMY N/A 1/19/2022    Procedure: HYSTERECTOMY, TOTAL, LAPAROSCOPIC;  Surgeon: Mike Manzo MD;  Location: [facility] OR;  Service: OB/GYN;  Laterality: N/A;  RN PREOP 1/10/2022   COVID---NEGATIVE, UPT--NEG, T/S ON 1/18/2022     Family History   Problem Relation Age of Onset    Hypertension Mother     Cancer Mother     HIV Mother     Hypertension Father     Heart disease Father     Hypertension Maternal Grandmother     Alzheimer's disease Maternal Grandmother      Social History     Tobacco Use    Smoking status: Former     Current packs/day: 0.00     Types:  Cigarettes     Quit date: 1998     Years since quittin.2    Smokeless tobacco: Never   Substance Use Topics    Alcohol use: Yes     Comment: OCCASIONAL    Drug use: No     Review of Systems   Constitutional:  Negative for chills and fever.   HENT:  Negative for congestion, ear pain, rhinorrhea, sore throat and trouble swallowing.    Respiratory:  Negative for cough and shortness of breath.    Cardiovascular:  Negative for chest pain.   Gastrointestinal:  Positive for diarrhea, nausea and vomiting. Negative for abdominal pain and blood in stool.   Genitourinary:  Negative for dysuria, flank pain, frequency, hematuria, vaginal bleeding and vaginal discharge.   Musculoskeletal:  Negative for neck pain and neck stiffness.   Neurological:  Negative for dizziness, light-headedness and headaches.       Physical Exam     Initial Vitals [24 1019]   BP Pulse Resp Temp SpO2   129/83 98 18 98.7 °F (37.1 °C) 98 %      MAP       --         Physical Exam    Nursing note and vitals reviewed.  Constitutional: She appears well-developed and well-nourished.   Patient looks well. Eating popcorn without any difficulty.    HENT:   Head: Normocephalic and atraumatic.   Right Ear: External ear normal.   Left Ear: External ear normal.   Neck: Carotid bruit is not present.   Normal range of motion.  Cardiovascular:  Normal rate, regular rhythm and intact distal pulses.     Exam reveals no gallop and no friction rub.       Murmur heard.  Systolic murmur is present with a grade of 2/6.  Murmur heard best at left lower sternal boarder.   Pulmonary/Chest: Breath sounds normal. No respiratory distress. She has no wheezes. She has no rhonchi. She has no rales.   Abdominal: Abdomen is soft. Bowel sounds are normal. She exhibits no distension. There is no abdominal tenderness. There is no rebound and no guarding.   Musculoskeletal:         General: Normal range of motion.      Cervical back: Normal range of motion.     Neurological:  She is alert and oriented to person, place, and time. GCS score is 15. GCS eye subscore is 4. GCS verbal subscore is 5. GCS motor subscore is 6.   Psychiatric: She has a normal mood and affect.         ED Course   Procedures  Labs Reviewed   COMPREHENSIVE METABOLIC PANEL - Abnormal; Notable for the following components:       Result Value    Sodium 135 (*)     Chloride 111 (*)     CO2 19 (*)     BUN 22 (*)     Anion Gap 5 (*)     All other components within normal limits   URINALYSIS, REFLEX TO URINE CULTURE - Abnormal; Notable for the following components:    Protein, UA Trace (*)     Occult Blood UA 2+ (*)     All other components within normal limits    Narrative:     Specimen Source->Urine   CBC W/ AUTO DIFFERENTIAL   LIPASE   URINALYSIS MICROSCOPIC    Narrative:     Specimen Source->Urine   POCT STREP A MOLECULAR          Imaging Results    None          Medications   lactated ringers bolus 1,000 mL (1,000 mLs Intravenous New Bag 4/12/24 1059)   ondansetron injection 4 mg (4 mg Intravenous Given 4/12/24 1058)     Medical Decision Making  This is an emergent evaluation of a 58-year-old female with a past medical history of hypertension who presents to the emergency department for evaluation of nausea, vomiting, diarrhea x4 days.  No hematemesis.  No blood in the stool.  No abdominal pain.    Patient looks well clinically.  She is eating popcorn on my exam without any difficulty. Regular rate rhythm with grade 2/6 systolic ejection murmur heard best at the left lower sternal border.  No carotid bruits appreciated on exam. Lungs are clear to auscultation bilaterally.  Abdomen is soft, nontender, non distended, with normal bowel sounds.     Differential diagnosis includes but is not limited to strep, viral syndrome, dehydration, electrolyte abnormality, appendicitis, cholecystitis, GERD/gastritis, pancreatitis, infectious diarrhea.  Considered an acute abdomen but doubtful given benign abdominal exam.    Workup  initiated with basic labs, lipase, strep swabs, urinalysis.  Vital signs, chart, labs, and/or imaging were all reviewed.  See ED course below and interpretations above. My overall impression is viral gastroenteritis. Will discharge home with Zofran.  No emergent pathology. Patient is very well appearing, and in no acute distress. Vital signs are reassuring here in the emergency department, patient is afebrile, breathing comfortable, satting 98 % on room air. Patient/Caregiver is stable for discharge at this time.  Patient/Caregiver was informed of results and plan of care. Patient/Caregiver verbalized understanding of care plan. All questions and concerns were addressed. Discussed strict return precautions with the patient/caregiver. Instructed follow up with primary care provider within 1 week.      Andrew Zuniga PA-C    DISCLAIMER: This note was prepared with [employer] voice recognition transcription software. Garbled syntax, mangled pronouns, and other bizarre constructions may be attributed to that software system.      Amount and/or Complexity of Data Reviewed  Labs: ordered. Decision-making details documented in ED Course.    Risk  Prescription drug management.            Scribe Attestation:   Scribe #1: I performed the above scribed service and the documentation accurately describes the services I performed. I attest to the accuracy of the note.        ED Course as of 04/12/24 1214   Fri Apr 12, 2024   1119 CBC auto differential  CBC is without leukocytosis or anemia, normal platelets. [TM]   1132 POCT Strep A, Molecular  neg [TM]   1145 Comprehensive metabolic panel(!)  CMP with creatinine of 0.8, BUN of 22, chloride of 111, sodium of 135, no other abnormalities. [TM]   1146 Lipase  Lipase within normal limits, pancreatitis unlikely. [TM]   1214 Urinalysis, Reflex to Urine Culture Urine, Clean Catch(!)  Urinalysis showing no signs of infection, negative for nitrites or leukocytes. [TM]      ED Course User  Index  [TM] Andrew Zuniga PA-C                         I, Andrew Zuniga PA-C, personally performed the services described in this documentation. All medical record entries made by the scribe were at my direction and in my presence. I have reviewed the chart and agree that the record reflects my personal performance and is accurate and complete.    Clinical Impression:  Final diagnoses:  [A08.4] Viral gastroenteritis (Primary)          ED Disposition Condition    Discharge Stable          ED Prescriptions       Medication Sig Dispense Start Date End Date Auth. Provider    ondansetron (ZOFRAN-ODT) 4 MG TbDL Take 1 tablet (4 mg total) by mouth every 6 (six) hours as needed. 14 tablet 4/12/2024 -- Andrew Zuniga PA-C          Follow-up Information       Follow up With Specialties Details Why Contact Info    Anthony-Rudy Samuels, [facility]   [address]  [facility] [address]  [phone]      [facility] - Emergency Dept Emergency Medicine Go to  As needed, If symptoms worsen, or new symptoms develop [address] [facility] [place] [id number] [phone]             Andrew Zuniga PA-C  04/12/24 1584

## 2024-04-12 NOTE — DISCHARGE INSTRUCTIONS
You were seen in the emergency department today for nausea, vomiting, diarrhea and were diagnosed with viral illness.  Take Zofran as prescribed.  It is important to remember that some problems are difficult to diagnose and may not be found during your Emergency Department visit. Be sure to follow up with your primary care doctor and review all labs/imaging/tests that were performed during this visit with them. Some labs/tests may be outside of the normal range and require non-emergent follow-up and further investigation to help diagnose/exclude/prevent complications or other medical conditions. Return to the emergency department for any new or worsening symptoms. Thank you for allowing me to care for you today, it was my pleasure. I hope you get to feeling better soon!

## 2024-10-24 ENCOUNTER — HOSPITAL ENCOUNTER (EMERGENCY)
Facility: HOSPITAL | Age: 59
Discharge: HOME | End: 2024-10-24
Attending: EMERGENCY MEDICINE
Payer: MEDICAID

## 2024-10-24 VITALS
DIASTOLIC BLOOD PRESSURE: 76 MMHG | RESPIRATION RATE: 17 BRPM | OXYGEN SATURATION: 98 % | HEART RATE: 102 BPM | SYSTOLIC BLOOD PRESSURE: 130 MMHG | WEIGHT: 210 LBS | TEMPERATURE: 98 F | BODY MASS INDEX: 37.21 KG/M2 | HEIGHT: 63 IN

## 2024-10-24 DIAGNOSIS — S76.312A STRAIN OF LEFT HAMSTRING, INITIAL ENCOUNTER: Primary | ICD-10-CM

## 2024-10-24 DIAGNOSIS — M79.605 LEG PAIN, POSTERIOR, LEFT: ICD-10-CM

## 2024-10-24 PROCEDURE — 63600175 PHARM REV CODE 636 W HCPCS

## 2024-10-24 PROCEDURE — 99284 EMERGENCY DEPT VISIT MOD MDM: CPT | Mod: 25

## 2024-10-24 PROCEDURE — 96372 THER/PROPH/DIAG INJ SC/IM: CPT

## 2024-10-24 RX ORDER — KETOROLAC TROMETHAMINE 30 MG/ML
15 INJECTION, SOLUTION INTRAMUSCULAR; INTRAVENOUS
Status: COMPLETED | OUTPATIENT
Start: 2024-10-24 | End: 2024-10-24

## 2024-10-24 RX ADMIN — KETOROLAC TROMETHAMINE 15 MG: 30 INJECTION, SOLUTION INTRAMUSCULAR at 03:10

## 2025-01-03 ENCOUNTER — HOSPITAL ENCOUNTER (EMERGENCY)
Facility: HOSPITAL | Age: 60
Discharge: HOME | End: 2025-01-03
Attending: EMERGENCY MEDICINE
Payer: MEDICAID

## 2025-01-03 VITALS
HEART RATE: 98 BPM | DIASTOLIC BLOOD PRESSURE: 73 MMHG | BODY MASS INDEX: 40.75 KG/M2 | OXYGEN SATURATION: 99 % | HEIGHT: 63 IN | WEIGHT: 230 LBS | RESPIRATION RATE: 18 BRPM | TEMPERATURE: 101 F | SYSTOLIC BLOOD PRESSURE: 133 MMHG

## 2025-01-03 DIAGNOSIS — B34.9 VIRAL SYNDROME: ICD-10-CM

## 2025-01-03 DIAGNOSIS — E86.0 DEHYDRATION: ICD-10-CM

## 2025-01-03 DIAGNOSIS — R11.10 VOMITING, UNSPECIFIED VOMITING TYPE, UNSPECIFIED WHETHER NAUSEA PRESENT: ICD-10-CM

## 2025-01-03 DIAGNOSIS — R19.7 DIARRHEA, UNSPECIFIED TYPE: Primary | ICD-10-CM

## 2025-01-03 LAB
ALBUMIN SERPL BCP-MCNC: 3.9 G/DL (ref 3.5–5.2)
ALP SERPL-CCNC: 85 U/L (ref 40–150)
ALT SERPL W/O P-5'-P-CCNC: 18 U/L (ref 10–44)
ANION GAP SERPL CALC-SCNC: 10 MMOL/L (ref 8–16)
AST SERPL-CCNC: 17 U/L (ref 10–40)
BASOPHILS # BLD AUTO: 0 K/UL (ref 0–0.2)
BASOPHILS NFR BLD: 0 % (ref 0–1.9)
BILIRUB SERPL-MCNC: 0.2 MG/DL (ref 0.1–1)
BILIRUB UR QL STRIP: NEGATIVE
BUN SERPL-MCNC: 14 MG/DL (ref 6–20)
CALCIUM SERPL-MCNC: 9.6 MG/DL (ref 8.7–10.5)
CHLORIDE SERPL-SCNC: 106 MMOL/L (ref 95–110)
CLARITY UR: CLEAR
CO2 SERPL-SCNC: 23 MMOL/L (ref 23–29)
COLOR UR: YELLOW
CREAT SERPL-MCNC: 0.8 MG/DL (ref 0.5–1.4)
CTP QC/QA: YES
CTP QC/QA: YES
DIFFERENTIAL METHOD BLD: ABNORMAL
EOSINOPHIL # BLD AUTO: 0 K/UL (ref 0–0.5)
EOSINOPHIL NFR BLD: 0 % (ref 0–8)
ERYTHROCYTE [DISTWIDTH] IN BLOOD BY AUTOMATED COUNT: 14.1 % (ref 11.5–14.5)
EST. GFR  (NO RACE VARIABLE): >60 ML/MIN/1.73 M^2
GLUCOSE SERPL-MCNC: 109 MG/DL (ref 70–110)
GLUCOSE UR QL STRIP: NEGATIVE
HCT VFR BLD AUTO: 39.9 % (ref 37–48.5)
HGB BLD-MCNC: 13.2 G/DL (ref 12–16)
HGB UR QL STRIP: ABNORMAL
IMM GRANULOCYTES # BLD AUTO: 0.03 K/UL (ref 0–0.04)
IMM GRANULOCYTES NFR BLD AUTO: 0.3 % (ref 0–0.5)
KETONES UR QL STRIP: NEGATIVE
LEUKOCYTE ESTERASE UR QL STRIP: NEGATIVE
LIPASE SERPL-CCNC: 32 U/L (ref 4–60)
LYMPHOCYTES # BLD AUTO: 0.5 K/UL (ref 1–4.8)
LYMPHOCYTES NFR BLD: 4.4 % (ref 18–48)
MCH RBC QN AUTO: 27.7 PG (ref 27–31)
MCHC RBC AUTO-ENTMCNC: 33.1 G/DL (ref 32–36)
MCV RBC AUTO: 84 FL (ref 82–98)
MICROSCOPIC COMMENT: ABNORMAL
MONOCYTES # BLD AUTO: 0.4 K/UL (ref 0.3–1)
MONOCYTES NFR BLD: 3.9 % (ref 4–15)
NEUTROPHILS # BLD AUTO: 9.3 K/UL (ref 1.8–7.7)
NEUTROPHILS NFR BLD: 91.4 % (ref 38–73)
NITRITE UR QL STRIP: NEGATIVE
NRBC BLD-RTO: 0 /100 WBC
PH UR STRIP: 7 [PH] (ref 5–8)
PLATELET # BLD AUTO: 223 K/UL (ref 150–450)
PMV BLD AUTO: 10.1 FL (ref 9.2–12.9)
POC MOLECULAR INFLUENZA A AGN: NEGATIVE
POC MOLECULAR INFLUENZA B AGN: NEGATIVE
POTASSIUM SERPL-SCNC: 3.8 MMOL/L (ref 3.5–5.1)
PROT SERPL-MCNC: 8.4 G/DL (ref 6–8.4)
PROT UR QL STRIP: NEGATIVE
RBC # BLD AUTO: 4.76 M/UL (ref 4–5.4)
RBC #/AREA URNS HPF: 10 /HPF (ref 0–4)
SARS-COV-2 RDRP RESP QL NAA+PROBE: NEGATIVE
SODIUM SERPL-SCNC: 139 MMOL/L (ref 136–145)
SP GR UR STRIP: 1.02 (ref 1–1.03)
URN SPEC COLLECT METH UR: ABNORMAL
UROBILINOGEN UR STRIP-ACNC: NEGATIVE EU/DL
WBC # BLD AUTO: 10.2 K/UL (ref 3.9–12.7)

## 2025-01-03 PROCEDURE — 99284 EMERGENCY DEPT VISIT MOD MDM: CPT | Mod: 25

## 2025-01-03 PROCEDURE — 96361 HYDRATE IV INFUSION ADD-ON: CPT

## 2025-01-03 PROCEDURE — 63600175 PHARM REV CODE 636 W HCPCS: Performed by: EMERGENCY MEDICINE

## 2025-01-03 PROCEDURE — 96374 THER/PROPH/DIAG INJ IV PUSH: CPT

## 2025-01-03 PROCEDURE — 80053 COMPREHEN METABOLIC PANEL: CPT | Performed by: EMERGENCY MEDICINE

## 2025-01-03 PROCEDURE — 25000003 PHARM REV CODE 250: Performed by: EMERGENCY MEDICINE

## 2025-01-03 PROCEDURE — 83690 ASSAY OF LIPASE: CPT | Performed by: EMERGENCY MEDICINE

## 2025-01-03 PROCEDURE — 85025 COMPLETE CBC W/AUTO DIFF WBC: CPT | Performed by: EMERGENCY MEDICINE

## 2025-01-03 PROCEDURE — 87635 SARS-COV-2 COVID-19 AMP PRB: CPT | Performed by: EMERGENCY MEDICINE

## 2025-01-03 PROCEDURE — 25000003 PHARM REV CODE 250: Performed by: NURSE PRACTITIONER

## 2025-01-03 PROCEDURE — 81000 URINALYSIS NONAUTO W/SCOPE: CPT | Performed by: EMERGENCY MEDICINE

## 2025-01-03 PROCEDURE — 87502 INFLUENZA DNA AMP PROBE: CPT

## 2025-01-03 RX ORDER — LOPERAMIDE HYDROCHLORIDE 2 MG/1
2 CAPSULE ORAL 4 TIMES DAILY PRN
Qty: 12 CAPSULE | Refills: 0 | Status: SHIPPED | OUTPATIENT
Start: 2025-01-03 | End: 2025-01-13

## 2025-01-03 RX ORDER — GUAIFENESIN 100 MG/5ML
100-200 SOLUTION ORAL EVERY 4 HOURS PRN
Qty: 60 ML | Refills: 0 | Status: SHIPPED | OUTPATIENT
Start: 2025-01-03 | End: 2025-01-13

## 2025-01-03 RX ORDER — LOPERAMIDE HYDROCHLORIDE 2 MG/1
4 CAPSULE ORAL
Status: COMPLETED | OUTPATIENT
Start: 2025-01-03 | End: 2025-01-03

## 2025-01-03 RX ORDER — ACETAMINOPHEN 500 MG
1000 TABLET ORAL
Status: COMPLETED | OUTPATIENT
Start: 2025-01-03 | End: 2025-01-03

## 2025-01-03 RX ORDER — GUAIFENESIN 100 MG/5ML
200 SOLUTION ORAL ONCE
Status: COMPLETED | OUTPATIENT
Start: 2025-01-03 | End: 2025-01-03

## 2025-01-03 RX ORDER — ONDANSETRON HYDROCHLORIDE 2 MG/ML
4 INJECTION, SOLUTION INTRAVENOUS
Status: COMPLETED | OUTPATIENT
Start: 2025-01-03 | End: 2025-01-03

## 2025-01-03 RX ORDER — ONDANSETRON 4 MG/1
4 TABLET, ORALLY DISINTEGRATING ORAL EVERY 8 HOURS PRN
Qty: 15 TABLET | Refills: 0 | Status: SHIPPED | OUTPATIENT
Start: 2025-01-03 | End: 2025-01-08

## 2025-01-03 RX ADMIN — ACETAMINOPHEN 1000 MG: 500 TABLET ORAL at 01:01

## 2025-01-03 RX ADMIN — GUAIFENESIN 200 MG: 200 SOLUTION ORAL at 05:01

## 2025-01-03 RX ADMIN — LOPERAMIDE HYDROCHLORIDE 4 MG: 2 CAPSULE ORAL at 01:01

## 2025-01-03 RX ADMIN — ONDANSETRON 4 MG: 2 INJECTION INTRAMUSCULAR; INTRAVENOUS at 01:01

## 2025-01-03 RX ADMIN — SODIUM CHLORIDE, POTASSIUM CHLORIDE, SODIUM LACTATE AND CALCIUM CHLORIDE 1000 ML: 600; 310; 30; 20 INJECTION, SOLUTION INTRAVENOUS at 01:01

## 2025-01-03 NOTE — Clinical Note
"Lupe Ortezfranklin Paredes was seen and treated in our emergency department on 1/3/2025.  She may return to work on 01/06/2025.       If you have any questions or concerns, please don't hesitate to call.      Malissa Gore MD"

## 2025-01-03 NOTE — DISCHARGE INSTRUCTIONS
As we discussed, it is important that you return to the ER for any new concerns or symptoms, worsening of your existing symptoms, if you do not completely improve, or if you are unable to be seen by your primary care provider.  An ER visit cannot replace the evaluation by your primary care provider!!    In the emergency department our goal is to rule-out life threatening conditions and make sure that you are safe to be discharged home. Many medical conditions are difficult to diagnose and may be impossible to be diagnosed during a single ER visit. These conditions often start with non-specific symptoms and can only be diagnosed on follow up visits with your primary care physician or specialist when the symptoms continue or change. Please remember that all medical conditions can change, and we cannot predict how you will be feeling tomorrow or the next day, so if you have any worsening or new symptoms, you should not hesitate to return to the emergency department for re-evaluation.        Be sure to follow up with your primary care doctor for a recheck and to review any labs/imaging that were performed today.  It is very common for us to identify non-emergent incidental findings on labs and imaging which must be followed up with your primary care physician. If you do not have a primary care doctor, you may contact the one listed on your discharge paperwork or you may also call the [facility] Appointment Desk at [phone] to schedule an appointment with one.       All medications have side effects.  We have done our best to select a medication for you that will treat your health problem and have the least amount of side effects.  If at any time while or after you take this medication you develops new symptoms or have any concerns, it is important you stop taking the medication and call your primary care provider or return to the emergency department for evaluation.

## 2025-01-03 NOTE — ED PROVIDER NOTES
SCRIBE #1 NOTE: Vincent SILVA, am scribing for, and in the presence of,  Malissa Gore MD. Other sections scribed: HPI, ROS, PE.           EM PHYSICIAN NOTE       This patient presents with a complaint of   Chief Complaint   Patient presents with    Abdominal Cramping     Pt c/o abdominal cramping,vomiting and diarrhea x1 day.Pt denied taking any OTC medications. Pt reported  was sick with the same symptoms and now she is sick.       Source of HPI & ROS: patient    HPI: Patient is a 59 y.o. F with a PMHx of HTN, DM, and High cholesterol who presents to the ED for emergent evaluation of acute vomiting with associated diarrhea that began yesterday. Pt reports abdominal pain, and throat pain only with vomiting. She currently denies abdominal pain. Pt endorses headache. She has been unable to tolerate her daily medications. No OTC treatment attempted for the symptoms. Pt reports ill contact with  who was not evaluated, or treated, but his symptoms have resolved. Pt denies fever, hematemesis, hematochezia, abdominal pain, leg swelling, leg pain, dysuria, Hx of abdominal surgeries.      Review of patient's allergies indicates:  No Known Allergies    [facility] pharmacy: [facility] located at [address], [place] [address]         Pertinent REVIEW of SYSTEMS    GENERAL/CONSTITUTIONAL: There is not a report of fever   CARDIOVASCULAR: There is not a report of chest pain   RESPIRATORY: There is not a report of cough or SOB  GASTROINTESTINAL: There is a report of  vomiting, diarrhea. There is not a report of abdominal pain, hematemesis, or hematochezia  MUSCULOSKELETAL: There is not a report of leg pain, or leg swelling  GENITOURINARY: There is not a report of dysuria  NEUROLOGICAL: There is a report of headache  HEMATOLOGIC/LYMPHATIC: There is not a report of anticoagulant/antithrombotic use.       The nurse's notes and triage vital signs were reviewed.    PHYSICAL EXAMINATION    ED Triage Vitals  "[01/03/25 1101]   Encounter Vitals Group      /75      Systolic BP Percentile       Diastolic BP Percentile       Pulse 110      Resp 16      Temp (!) 100.4 °F (38 °C)      Temp Source Oral      SpO2 100 %      Weight 230 lb      Height 5' 3"      Head Circumference       Peak Flow       Pain Score       Pain Loc       Pain Education       Exclude from Growth Chart      Vital signs and Pulse Ox reviewed in clinical context. Abnormalities noted: None  Body mass index is 40.74 kg/m².  Pt's level of consciousness is Awake and Alert, and the patient is in mild distress.  Skin: warm, pink and dry.  Capillary refill is less than 2 seconds.  Mucosa: dry  Head and Neck: no JVD, neck supple  Cardiac exam: RRR I did not appreciate a murmur.  Pulmonary exam: unlabored and clear  Abd Exam: soft nontender   Musculoskeletal: no joint tenderness, deformity or swelling   Neurologic: GCS 15; moving all extremities equally, no facial droop       Medical decision making:   Nurses notes and Vital Signs reviewed.     Problems: Today's visit reveals vomiting and diarrhea which is a/an Acute problem that is concerning for deterioration due to a differential diagnosis that includes COVID, influenza, dehydration.             See ER course below for lab test ordered, results reviewed, independent interpretation of images or EKG, discussion with consultants, data obtained from sources other than patient:  ED Course as of 01/03/25 1902   Fri Jan 03, 2025   1632 CBC reveals a normal white count with a granulocytosis.  No anemia.  CMP is normal [MH]   1633 COVID and influenza are negative [MH]   1633 UA is negative [MH]   1717 Feeling better. Tolerating PO [MH]      ED Course User Index  [MH] Malissa Gore MD            Orders Placed This Encounter   Procedures    CBC W/ AUTO DIFFERENTIAL    Comp. Metabolic Panel    Lipase    Urinalysis, Reflex to Urine Culture Urine, Clean Catch    Urinalysis Microscopic    Vital signs    POCT " Influenza A/B Molecular    POCT COVID-19 Rapid Screening    Insert peripheral IV     Medications   acetaminophen tablet 1,000 mg (1,000 mg Oral Given 1/3/25 1323)   ondansetron injection 4 mg (4 mg Intravenous Given 1/3/25 1318)   lactated ringers bolus 1,000 mL (0 mLs Intravenous Stopped 1/3/25 1504)   loperamide capsule 4 mg (4 mg Oral Given 1/3/25 1323)   guaiFENesin 100 mg/5 ml syrup 200 mg (200 mg Oral Given 1/3/25 1734)           Diagnoses that have been ruled out:   None   Diagnoses that are still under consideration:   None   Final diagnoses:   Diarrhea, unspecified type   Vomiting, unspecified vomiting type, unspecified whether nausea present   Viral syndrome   Dehydration          Disposition:  Discharge      Referral for follow-up  Follow-up Information       Follow up With Specialties Details Why Contact Info    Rudy Cardoza NP Family Medicine In 2 days Call to schedule an appointment [address] [id number] [phone]              Prescription management:  ED Prescriptions       Medication Sig Dispense Start Date End Date Auth. Provider    loperamide (IMODIUM) 2 mg capsule Take 1 capsule (2 mg total) by mouth 4 (four) times daily as needed for Diarrhea. 12 capsule 1/3/2025 1/13/2025 Malissa Gore MD    ondansetron (ZOFRAN-ODT) 4 MG TbDL Take 1 tablet (4 mg total) by mouth every 8 (eight) hours as needed (Nausea). 15 tablet 1/3/2025 1/8/2025 Malissa Gore MD    guaiFENesin 100 mg/5 ml (ROBITUSSIN) 100 mg/5 mL syrup Take 5-10 mLs (100-200 mg total) by mouth every 4 (four) hours as needed for Cough. 60 mL 1/3/2025 1/13/2025 Malissa Gore MD Micelle J Haydel      This note was created using [employer].  This program may occasionally misinterpret certain words and phrases.      SCRIBE ATTESTATION NOTE:  I attest that I personally performed the services documented by the scribe and acknowledged and confirm the content of the  note.   Nurses notes were reviewed.  Malissa Garner MD  01/03/25 1559

## 2025-01-03 NOTE — ED TRIAGE NOTES
Pt woke up with generalized abd pain, nvd, body aches, cough. MD is at the bedside. Denies dysuria

## (undated) DEVICE — TROCAR ENDOPATH XCEL 5X100MM

## (undated) DEVICE — GOWN XX-LARGE

## (undated) DEVICE — ELECTRODE REM PLYHSV RETURN 9

## (undated) DEVICE — NDL INSUF ULTRA VERESS 120MM

## (undated) DEVICE — GLOVE SURG BIOGEL LATEX SZ 7.5

## (undated) DEVICE — ENDOSTITCH INSTRUMENT

## (undated) DEVICE — SEE MEDLINE ITEM 157110

## (undated) DEVICE — TOWEL OR DISP STRL BLUE 4/PK

## (undated) DEVICE — SYR 50CC LL

## (undated) DEVICE — TUBING INSUFFLATION 10

## (undated) DEVICE — BLANKET UPPER BODY 78.7X29.9IN

## (undated) DEVICE — SOL NS 1000CC

## (undated) DEVICE — SUT RAPIDE 4-0

## (undated) DEVICE — SUT VLOC 180 ABSORB ESTITCH

## (undated) DEVICE — SYR 10CC LUER LOCK

## (undated) DEVICE — Device

## (undated) DEVICE — IRRIGATOR ENDOSCOPY DISP.

## (undated) DEVICE — TRAY FOLEY 16FR INFECTION CONT

## (undated) DEVICE — SUT VICRYL PLUS 0 CT1 36IN

## (undated) DEVICE — SEE MEDLINE ITEM 154981

## (undated) DEVICE — SUPPORT ULNA NERVE PROTECTOR

## (undated) DEVICE — PACK LAPAROSCOPY/PELVISCOPY II

## (undated) DEVICE — DEVICE N-SEAL LAPROSCOPIC

## (undated) DEVICE — KIT ANTIFOG

## (undated) DEVICE — SEE MEDLINE ITEM 157150

## (undated) DEVICE — APPLICATOR ARISTA FLEX XL

## (undated) DEVICE — OCCLUDER COLPO-PNEUMO STERILE

## (undated) DEVICE — MAT QUICK 40X30 FLOOR FLUID LF

## (undated) DEVICE — APPLICATOR CHLORAPREP ORN 26ML

## (undated) DEVICE — SEE MEDLINE ITEM 146292

## (undated) DEVICE — CANISTER SUCTION 2 LTR

## (undated) DEVICE — UNDERGLOVES BIOGEL PI SIZE 7.5

## (undated) DEVICE — PAD PREP 50/CA

## (undated) DEVICE — SOL 9P NACL IRR PIC IL

## (undated) DEVICE — PAD SANITARY OB STERILE

## (undated) DEVICE — BOWL STERILE LARGE 32OZ

## (undated) DEVICE — DRAPE STERI LONG

## (undated) DEVICE — BLADE SURG CARBON STEEL SZ11

## (undated) DEVICE — DRESSING ADH ISLAND 2.5 X 3

## (undated) DEVICE — ADAPTER DISP HAND SWITCH

## (undated) DEVICE — COVER OVERHEAD SURG LT BLUE

## (undated) DEVICE — TIP RUMI BLUE DISPOSABLE 5/BX

## (undated) DEVICE — SOL CLEARIFY VISUALIZATION LAP

## (undated) DEVICE — HOOK DISSECTING 5MM

## (undated) DEVICE — SPONGE GAUZE 16PLY 4X4

## (undated) DEVICE — TROCAR ENDOPATH XCEL 11MM 10CM